# Patient Record
Sex: MALE | Race: WHITE | URBAN - METROPOLITAN AREA
[De-identification: names, ages, dates, MRNs, and addresses within clinical notes are randomized per-mention and may not be internally consistent; named-entity substitution may affect disease eponyms.]

---

## 2021-02-26 ENCOUNTER — OUTPATIENT (OUTPATIENT)
Dept: OUTPATIENT SERVICES | Facility: HOSPITAL | Age: 26
LOS: 1 days | End: 2021-02-26
Payer: COMMERCIAL

## 2021-02-26 DIAGNOSIS — Z20.828 CONTACT WITH AND (SUSPECTED) EXPOSURE TO OTHER VIRAL COMMUNICABLE DISEASES: ICD-10-CM

## 2021-02-26 LAB — SARS-COV-2 RNA SPEC QL NAA+PROBE: DETECTED

## 2021-02-26 PROCEDURE — U0003: CPT

## 2021-02-26 PROCEDURE — U0005: CPT

## 2021-02-26 PROCEDURE — C9803: CPT

## 2021-02-27 DIAGNOSIS — Z20.828 CONTACT WITH AND (SUSPECTED) EXPOSURE TO OTHER VIRAL COMMUNICABLE DISEASES: ICD-10-CM

## 2021-07-10 ENCOUNTER — EMERGENCY (EMERGENCY)
Facility: HOSPITAL | Age: 26
LOS: 1 days | End: 2021-07-10
Attending: EMERGENCY MEDICINE
Payer: COMMERCIAL

## 2021-07-10 VITALS
HEART RATE: 88 BPM | RESPIRATION RATE: 18 BRPM | SYSTOLIC BLOOD PRESSURE: 128 MMHG | WEIGHT: 240.08 LBS | HEIGHT: 73 IN | OXYGEN SATURATION: 98 % | DIASTOLIC BLOOD PRESSURE: 80 MMHG

## 2021-07-10 PROCEDURE — 99284 EMERGENCY DEPT VISIT MOD MDM: CPT | Mod: 25

## 2021-07-10 PROCEDURE — 73080 X-RAY EXAM OF ELBOW: CPT | Mod: 26,RT

## 2021-07-10 PROCEDURE — 73080 X-RAY EXAM OF ELBOW: CPT

## 2021-07-10 PROCEDURE — 99284 EMERGENCY DEPT VISIT MOD MDM: CPT

## 2021-07-10 RX ORDER — IBUPROFEN 200 MG
600 TABLET ORAL ONCE
Refills: 0 | Status: COMPLETED | OUTPATIENT
Start: 2021-07-10 | End: 2021-07-10

## 2021-07-10 RX ORDER — ACETAMINOPHEN 500 MG
650 TABLET ORAL ONCE
Refills: 0 | Status: COMPLETED | OUTPATIENT
Start: 2021-07-10 | End: 2021-07-10

## 2021-07-10 RX ADMIN — Medication 600 MILLIGRAM(S): at 18:35

## 2021-07-10 RX ADMIN — Medication 650 MILLIGRAM(S): at 18:34
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
## 2021-07-10 NOTE — ED PROVIDER NOTE - PHYSICAL EXAMINATION
Gen: non toxic appearing, NAD   Head: NC/NT  Eyes:  anicteric  ENT: airway patent, mmm   CV: RRR, +S1/S2, no M/R/G, symmetric pulses,   Resp: CTAB, symmetric breath sounds   GI:   abdomen soft non-distended, NTTP   Back: no spinal ttp  Extremities - FROM,  +ttp of RT elbow  Skin: no rashes, colors changes or bruising of back  Neuro: A&Ox4, following commands, speech clear, moving all four extremities spontaneously, 5/5 strength, sensation intact

## 2021-07-10 NOTE — ED PROVIDER NOTE - NS ED ROS FT
Gen: Denies fever, chills  CV: Denies chest pain  Skin: Denies rash, erythema  Resp: Denies SOB, cough  ENT: Denies nasal congestion  Eyes: Denies blurry vision  GI: Denies nausea, vomiting, diarrhea  Msk: +pain in RT elbow and back pain  : Denies dysuria  Neuro: Denies headache

## 2021-07-10 NOTE — ED PROVIDER NOTE - NSFOLLOWUPINSTRUCTIONS_ED_ALL_ED_FT
You were seen for back pain and elbow pain.     No signs of emergency medical condition on today's workup.  Your results are attached with your discharge instructions, please review them with your primary care physician. If there is a result pending, you will receive a call if test is positive.    A presumptive diagnosis is made today, but further evaluation may be required by your primary care doctor and/or specialist for a definitive diagnosis. Therefore, follow up as directed and if symptoms change/worsen or any emergency conditions, please return to the ER.    For pain or fever you can ibuprofen (motrin, advil) or tylenol as needed, as directed on packaging.  You can use 500-1000mg Tylenol every 6 hours for pain - as needed.  This is an over-the-counter medications - please respect the warnings on the label. This medication come with certain risks and side effects that you need to discuss with your doctor, especially if you are taking it for a prolonged period.  You can use 400-600mg Ibuprofen (such as motrin or advil) every 6 to 8 hours as needed for pain control.  Take ibuprofen with food or milk to lessen stomach upset.  This is an over-the-counter medication please respect the warnings on the label. All medications come with certain risks and side effects that you need to discuss with your doctor, especially if you are taking them for a prolonged period.      If needed, call patient access services at [phone] to find a primary care doctor, or call at [phone] to make an appointment at the clinic.

## 2021-07-10 NOTE — ED PROVIDER NOTE - PATIENT PORTAL LINK FT
You can access the FollowMyHealth Patient Portal offered by [facility] by registering at the following website: [email or web address]. By joining [employer]’s FollowMyHealth portal, you will also be able to view your health information using other applications (apps) compatible with our system.
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
## 2021-07-10 NOTE — ED PROVIDER NOTE - CARE PLAN
Principal Discharge DX:	Elbow pain  Secondary Diagnosis:	Back pain   Principal Discharge DX:	Elbow strain, right, initial encounter  Secondary Diagnosis:	Back pain

## 2021-08-17 ENCOUNTER — EMERGENCY (EMERGENCY)
Facility: HOSPITAL | Age: 26
LOS: 1 days | Discharge: HOME | End: 2021-08-17
Attending: EMERGENCY MEDICINE
Payer: COMMERCIAL

## 2021-08-17 VITALS
RESPIRATION RATE: 16 BRPM | OXYGEN SATURATION: 98 % | SYSTOLIC BLOOD PRESSURE: 129 MMHG | HEIGHT: 73 IN | DIASTOLIC BLOOD PRESSURE: 90 MMHG | TEMPERATURE: 98 F | HEART RATE: 63 BPM | WEIGHT: 218.92 LBS

## 2021-08-17 PROCEDURE — 99283 EMERGENCY DEPT VISIT LOW MDM: CPT

## 2021-08-17 PROCEDURE — 99282 EMERGENCY DEPT VISIT SF MDM: CPT

## 2021-08-17 NOTE — ED PROVIDER NOTE - PATIENT PORTAL LINK FT
You can access the FollowMyHealth Patient Portal offered by [facility] by registering at the following website: [email or web address]. By joining [employer]’s FollowMyHealth portal, you will also be able to view your health information using other applications (apps) compatible with our system.
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
## 2021-08-17 NOTE — ED PROVIDER NOTE - PHYSICAL EXAMINATION
A&Ox3, NAD. NCAT. PERRL, EOMI. conjunctiva clear, no injection, no FB. VA 20/20OU, OS, OD, Neck supple. Sensations intact throughout. Gait steady.

## 2021-08-17 NOTE — ED PROVIDER NOTE - NSFOLLOWUPINSTRUCTIONS_ED_ALL_ED_FT
- stay hydrated.   - take tylenol 975mg and ibuprofen 600mg every 6 hours as needed for pain-take with meals.  - follow up with your pcp in 1-2 days.  - return if symptoms worsen, fever, changes in vision, eye discharge, and all other concerns.

## 2021-08-17 NOTE — ED ADULT NURSE NOTE - NS ED NURSE DISCH DISPOSITION
Mother calling states only place that does testing is [facility] in [facility] the earliest appt available is 07/21. Mother concerned this is to long to wait, she was under the impression this test was urget.  Mother would like to speak to Dr stat Discharged
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
## 2021-08-17 NOTE — ED PROVIDER NOTE - NS ED ROS FT
Constitutional: No fever or chills  Eyes: No visual changes, eye pain or redness  Neuro: No headache. No numbness or tingling. No weakness.

## 2022-02-20 ENCOUNTER — EMERGENCY (EMERGENCY)
Facility: HOSPITAL | Age: 27
LOS: 1 days | Discharge: HOME | End: 2022-02-20
Attending: EMERGENCY MEDICINE | Admitting: EMERGENCY MEDICINE
Payer: COMMERCIAL

## 2022-02-20 VITALS
OXYGEN SATURATION: 100 % | TEMPERATURE: 99 F | DIASTOLIC BLOOD PRESSURE: 86 MMHG | RESPIRATION RATE: 18 BRPM | HEART RATE: 102 BPM | HEIGHT: 73 IN | SYSTOLIC BLOOD PRESSURE: 128 MMHG

## 2022-02-20 PROCEDURE — 73590 X-RAY EXAM OF LOWER LEG: CPT | Mod: 26,RT

## 2022-02-20 PROCEDURE — 99053 MED SERV 10PM-8AM 24 HR FAC: CPT

## 2022-02-20 PROCEDURE — 99283 EMERGENCY DEPT VISIT LOW MDM: CPT

## 2022-02-20 NOTE — ED PROVIDER NOTE - PHYSICAL EXAMINATION
Gen: Well appearing in NAD  Head: NC/AT  Neck: trachea midline  Resp:  No distress  Ext: no deformities R shin TTP no deformity - R knee with no pain and no instability  Neuro:  A&O appears non focal  Skin:  Warm and dry as visualized - except for two abrasions on prox R shin  Psych:  Normal affect and mood
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
## 2022-02-20 NOTE — ED PROVIDER NOTE - PATIENT PORTAL LINK FT
You can access the FollowMyHealth Patient Portal offered by [facility] by registering at the following website: [email or web address]. By joining [employer]’s FollowMyHealth portal, you will also be able to view your health information using other applications (apps) compatible with our system.

## 2022-02-20 NOTE — ED PROVIDER NOTE - CARE PLAN
1 Principal Discharge DX:	Contusion of knee and lower leg, initial encounter  Secondary Diagnosis:	Skin abrasion

## 2022-09-23 ENCOUNTER — EMERGENCY (EMERGENCY)
Facility: HOSPITAL | Age: 27
LOS: 1 days | Discharge: HOME | End: 2022-09-23
Attending: EMERGENCY MEDICINE | Admitting: EMERGENCY MEDICINE
Payer: OTHER MISCELLANEOUS

## 2022-09-23 VITALS
RESPIRATION RATE: 18 BRPM | TEMPERATURE: 98 F | SYSTOLIC BLOOD PRESSURE: 120 MMHG | HEART RATE: 55 BPM | DIASTOLIC BLOOD PRESSURE: 75 MMHG | OXYGEN SATURATION: 97 %

## 2022-09-23 VITALS
OXYGEN SATURATION: 96 % | SYSTOLIC BLOOD PRESSURE: 116 MMHG | HEART RATE: 69 BPM | RESPIRATION RATE: 17 BRPM | WEIGHT: 250 LBS | DIASTOLIC BLOOD PRESSURE: 70 MMHG | TEMPERATURE: 98 F | HEIGHT: 73 IN

## 2022-09-23 LAB
APPEARANCE UR: CLEAR — SIGNIFICANT CHANGE UP
BILIRUB UR-MCNC: NEGATIVE — SIGNIFICANT CHANGE UP
COLOR SPEC: YELLOW — SIGNIFICANT CHANGE UP
DIFF PNL FLD: NEGATIVE — SIGNIFICANT CHANGE UP
GLUCOSE UR QL: NEGATIVE — SIGNIFICANT CHANGE UP
KETONES UR-MCNC: NEGATIVE — SIGNIFICANT CHANGE UP
LEUKOCYTE ESTERASE UR-ACNC: NEGATIVE — SIGNIFICANT CHANGE UP
NITRITE UR-MCNC: NEGATIVE — SIGNIFICANT CHANGE UP
PH UR: 6 — SIGNIFICANT CHANGE UP (ref 5–8)
PROT UR-MCNC: NEGATIVE MG/DL — SIGNIFICANT CHANGE UP
SP GR SPEC: >=1.03 — SIGNIFICANT CHANGE UP (ref 1–1.03)
UROBILINOGEN FLD QL: 0.2 E.U./DL — SIGNIFICANT CHANGE UP

## 2022-09-23 PROCEDURE — 76870 US EXAM SCROTUM: CPT

## 2022-09-23 PROCEDURE — 99284 EMERGENCY DEPT VISIT MOD MDM: CPT | Mod: 25

## 2022-09-23 PROCEDURE — 81003 URINALYSIS AUTO W/O SCOPE: CPT

## 2022-09-23 PROCEDURE — 76870 US EXAM SCROTUM: CPT | Mod: 26

## 2022-09-23 PROCEDURE — 99284 EMERGENCY DEPT VISIT MOD MDM: CPT

## 2022-09-23 RX ORDER — IBUPROFEN 200 MG
800 TABLET ORAL ONCE
Refills: 0 | Status: COMPLETED | OUTPATIENT
Start: 2022-09-23 | End: 2022-09-23

## 2022-09-23 RX ADMIN — Medication 800 MILLIGRAM(S): at 20:22
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
## 2022-09-23 NOTE — ED ADULT NURSE NOTE - PAIN RATING/NUMBER SCALE (0-10): REST
[FreeTextEntry1] : Annual\par SBIRT negative\par Depression screen negative\par Immunization records provided\par HPV vaccine not received, refuses for now.\par \par Check comprehensive labs, in office today\par \par Anxiety Doing well on Zoloft. 3

## 2022-09-23 NOTE — ED PROVIDER NOTE - PATIENT PORTAL LINK FT
You can access the FollowMyHealth Patient Portal offered by [facility] by registering at the following website: [email or web address]. By joining [employer]’s FollowMyHealth portal, you will also be able to view your health information using other applications (apps) compatible with our system.

## 2022-09-23 NOTE — ED PROVIDER NOTE - CARE PROVIDER_API CALL
Abelardo Collado)  Urology  [address] [place] [address], [place] [address]  Phone: [phone]  Fax: [phone]  Follow Up Time:
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
## 2022-09-23 NOTE — ED PROVIDER NOTE - NSFOLLOWUPINSTRUCTIONS_ED_ALL_ED_FT
[facility]IN - AfterCare(R) Instructions(ER/ED)           Scrotal Pain    WHAT YOU NEED TO KNOW:    Scrotal pain can happen at any age. The cause of scrotal pain can range from a minor injury to a serious medical condition. It is very important to seek immediate care if you have scrotal pain. The pain may be a warning sign of a serious condition that will need treatment. Without immediate care, you may be at increased risk for losing a testicle or being sterile (not having children).    DISCHARGE INSTRUCTIONS:    Return to the emergency department if:   •You have any warning signs of a serious problem.      •You have pain or swelling that starts or gets worse quickly.      •You have skin changes in your scrotum, such as a dark patch.      •You have a fever.      Contact your healthcare provider if:   •Your pain does not get better, even after you take pain medicine.      •You have new or worsening pain.      •You have questions or concerns about your condition or care.      Medicines: You may need any of the following:   •Prescription pain medicine may be given. Ask your healthcare provider how to take this medicine safely. Some prescription pain medicines contain acetaminophen. Do not take other medicines that contain acetaminophen without talking to your healthcare provider. Too much acetaminophen may cause liver damage. Prescription pain medicine may cause constipation. Ask your healthcare provider how to prevent or treat constipation.       •NSAIDs, such as ibuprofen, help decrease swelling, pain, and fever. This medicine is available with or without a doctor's order. NSAIDs can cause stomach bleeding or kidney problems in certain people. If you take blood thinner medicine, always ask your healthcare provider if NSAIDs are safe for you. Always read the medicine label and follow directions.      •Antibiotics are used to treat a bacterial infection.      •Take your medicine as directed. Contact your healthcare provider if you think your medicine is not helping or if you have side effects. Tell your provider if you are allergic to any medicine. Keep a list of the medicines, vitamins, and herbs you take. Include the amounts, and when and why you take them. Bring the list or the pill bottles to follow-up visits. Carry your medicine list with you in case of an emergency.      Manage your symptoms:   •Wear a support device, if directed. A support device, such as a jock strap, can help keep your scrotum lifted and supported. This can help decrease pain.      •Apply ice to your scrotum. Ice helps decrease pain and swelling. Use an ice pack, or put crushed ice in a plastic bag. Cover the pack or bag with a towel before you apply it to your scrotum. Apply ice for 15 to 20 minutes every hour, or as directed.      Follow up with your doctor as directed: Write down your questions so you remember to ask them during your visits.
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
## 2022-09-24 PROBLEM — Z78.9 OTHER SPECIFIED HEALTH STATUS: Chronic | Status: ACTIVE | Noted: 2022-02-20
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
## 2022-09-25 DIAGNOSIS — N50.819 TESTICULAR PAIN, UNSPECIFIED: ICD-10-CM

## 2023-06-28 ENCOUNTER — NON-APPOINTMENT (OUTPATIENT)
Age: 28
End: 2023-06-28

## 2023-07-01 ENCOUNTER — EMERGENCY (EMERGENCY)
Facility: HOSPITAL | Age: 28
LOS: 0 days | Discharge: HOME | End: 2023-07-01
Attending: EMERGENCY MEDICINE
Payer: COMMERCIAL

## 2023-07-01 VITALS — WEIGHT: 145.06 LBS

## 2023-07-01 VITALS
HEART RATE: 57 BPM | TEMPERATURE: 98 F | DIASTOLIC BLOOD PRESSURE: 65 MMHG | OXYGEN SATURATION: 98 % | SYSTOLIC BLOOD PRESSURE: 113 MMHG | RESPIRATION RATE: 19 BRPM

## 2023-07-01 DIAGNOSIS — M79.10 MYALGIA, UNSPECIFIED SITE: ICD-10-CM

## 2023-07-01 DIAGNOSIS — R50.9 FEVER, UNSPECIFIED: ICD-10-CM

## 2023-07-01 DIAGNOSIS — R51.9 HEADACHE, UNSPECIFIED: ICD-10-CM

## 2023-07-01 LAB
ANION GAP SERPL CALC-SCNC: 2 MMOL/L — LOW (ref 5–17)
BASOPHILS # BLD AUTO: 0 K/UL — SIGNIFICANT CHANGE UP (ref 0–0.2)
BASOPHILS NFR BLD AUTO: 0 % — SIGNIFICANT CHANGE UP (ref 0–2)
BUN SERPL-MCNC: 14 MG/DL — SIGNIFICANT CHANGE UP (ref 7–23)
CALCIUM SERPL-MCNC: 9 MG/DL — SIGNIFICANT CHANGE UP (ref 8.5–10.1)
CHLORIDE SERPL-SCNC: 108 MMOL/L — SIGNIFICANT CHANGE UP (ref 96–108)
CO2 SERPL-SCNC: 28 MMOL/L — SIGNIFICANT CHANGE UP (ref 22–31)
CREAT SERPL-MCNC: 0.96 MG/DL — SIGNIFICANT CHANGE UP (ref 0.5–1.3)
EGFR: 111 ML/MIN/1.73M2 — SIGNIFICANT CHANGE UP
EOSINOPHIL # BLD AUTO: 0.28 K/UL — SIGNIFICANT CHANGE UP (ref 0–0.5)
EOSINOPHIL NFR BLD AUTO: 5 % — SIGNIFICANT CHANGE UP (ref 0–6)
GLUCOSE SERPL-MCNC: 91 MG/DL — SIGNIFICANT CHANGE UP (ref 70–99)
HCT VFR BLD CALC: 42.1 % — SIGNIFICANT CHANGE UP (ref 39–50)
HGB BLD-MCNC: 14.8 G/DL — SIGNIFICANT CHANGE UP (ref 13–17)
LYMPHOCYTES # BLD AUTO: 1.9 K/UL — SIGNIFICANT CHANGE UP (ref 1–3.3)
LYMPHOCYTES # BLD AUTO: 34 % — SIGNIFICANT CHANGE UP (ref 13–44)
MANUAL SMEAR VERIFICATION: SIGNIFICANT CHANGE UP
MCHC RBC-ENTMCNC: 30.1 PG — SIGNIFICANT CHANGE UP (ref 27–34)
MCHC RBC-ENTMCNC: 35.2 GM/DL — SIGNIFICANT CHANGE UP (ref 32–36)
MCV RBC AUTO: 85.7 FL — SIGNIFICANT CHANGE UP (ref 80–100)
MONOCYTES # BLD AUTO: 0.84 K/UL — SIGNIFICANT CHANGE UP (ref 0–0.9)
MONOCYTES NFR BLD AUTO: 15 % — HIGH (ref 2–14)
NEUTROPHILS # BLD AUTO: 2.13 K/UL — SIGNIFICANT CHANGE UP (ref 1.8–7.4)
NEUTROPHILS NFR BLD AUTO: 37 % — LOW (ref 43–77)
NEUTS BAND # BLD: 1 % — SIGNIFICANT CHANGE UP (ref 0–8)
NRBC # BLD: 0 /100 — SIGNIFICANT CHANGE UP (ref 0–0)
NRBC # BLD: SIGNIFICANT CHANGE UP /100 WBCS (ref 0–0)
PLAT MORPH BLD: NORMAL — SIGNIFICANT CHANGE UP
PLATELET # BLD AUTO: 166 K/UL — SIGNIFICANT CHANGE UP (ref 150–400)
POTASSIUM SERPL-MCNC: 4.5 MMOL/L — SIGNIFICANT CHANGE UP (ref 3.5–5.3)
POTASSIUM SERPL-SCNC: 4.5 MMOL/L — SIGNIFICANT CHANGE UP (ref 3.5–5.3)
RBC # BLD: 4.91 M/UL — SIGNIFICANT CHANGE UP (ref 4.2–5.8)
RBC # FLD: 11.9 % — SIGNIFICANT CHANGE UP (ref 10.3–14.5)
RBC BLD AUTO: NORMAL — SIGNIFICANT CHANGE UP
SODIUM SERPL-SCNC: 138 MMOL/L — SIGNIFICANT CHANGE UP (ref 135–145)
VARIANT LYMPHS # BLD: 8 % — HIGH (ref 0–6)
WBC # BLD: 5.6 K/UL — SIGNIFICANT CHANGE UP (ref 3.8–10.5)
WBC # FLD AUTO: 5.6 K/UL — SIGNIFICANT CHANGE UP (ref 3.8–10.5)

## 2023-07-01 PROCEDURE — 96374 THER/PROPH/DIAG INJ IV PUSH: CPT

## 2023-07-01 PROCEDURE — 85025 COMPLETE CBC W/AUTO DIFF WBC: CPT

## 2023-07-01 PROCEDURE — 99284 EMERGENCY DEPT VISIT MOD MDM: CPT

## 2023-07-01 PROCEDURE — 36415 COLL VENOUS BLD VENIPUNCTURE: CPT

## 2023-07-01 PROCEDURE — 80048 BASIC METABOLIC PNL TOTAL CA: CPT

## 2023-07-01 PROCEDURE — 99284 EMERGENCY DEPT VISIT MOD MDM: CPT | Mod: 25

## 2023-07-01 PROCEDURE — 96375 TX/PRO/DX INJ NEW DRUG ADDON: CPT

## 2023-07-01 RX ORDER — SODIUM CHLORIDE 9 MG/ML
1000 INJECTION INTRAMUSCULAR; INTRAVENOUS; SUBCUTANEOUS ONCE
Refills: 0 | Status: COMPLETED | OUTPATIENT
Start: 2023-07-01 | End: 2023-07-01

## 2023-07-01 RX ORDER — METOCLOPRAMIDE HCL 10 MG
10 TABLET ORAL ONCE
Refills: 0 | Status: COMPLETED | OUTPATIENT
Start: 2023-07-01 | End: 2023-07-01

## 2023-07-01 RX ORDER — KETOROLAC TROMETHAMINE 30 MG/ML
30 SYRINGE (ML) INJECTION ONCE
Refills: 0 | Status: DISCONTINUED | OUTPATIENT
Start: 2023-07-01 | End: 2023-07-01

## 2023-07-01 RX ADMIN — SODIUM CHLORIDE 1000 MILLILITER(S): 9 INJECTION INTRAMUSCULAR; INTRAVENOUS; SUBCUTANEOUS at 10:44

## 2023-07-01 RX ADMIN — Medication 30 MILLIGRAM(S): at 10:44

## 2023-07-01 RX ADMIN — Medication 10 MILLIGRAM(S): at 11:12

## 2023-07-01 NOTE — ED PROVIDER NOTE - NSFOLLOWUPCLINICS_GEN_ALL_ED_FT
[facility]  Family Medicine  [address] [place] [address]  Phone: [phone]  Fax:   Follow Up Time: 1-3 Days

## 2023-07-01 NOTE — ED PROVIDER NOTE - OBJECTIVE STATEMENT
27 y/o male with no pertinent PMHx presents to the ED c/o headache and myalgias x6 days, fever Tmax 101.9 x2 days, now with neck stiffness and worse headache. Patient tested negative for flu at urgent care and was prescribed Tamiflu, which he has been taking without improvement. Last took 2 Tylenol 7AM this morning. Denies numbness/weakness, vomiting, other symptoms. Denies concern for tick bite. Nonsmoker, nondrinker. NKDA. 29 y/o male with no pertinent PMHx presents to the ED c/o headache and myalgias x6 days, fever Tmax 101.9 x2 days, now with neck pain and worse headache. Patient tested negative for flu at urgent care and was prescribed Tamiflu, which he has been taking without improvement. Last took 2 Tylenol 7AM this morning. Denies numbness/weakness, vomiting, other symptoms. Denies concern for tick bite. Nonsmoker, nondrinker. NKDA.

## 2023-07-01 NOTE — ED PROVIDER NOTE - PATIENT PORTAL LINK FT
You can access the FollowMyHealth Patient Portal offered by [facility] by registering at the following website: [email or web address]. By joining [employer]’s FollowMyHealth portal, you will also be able to view your health information using other applications (apps) compatible with our system.

## 2023-07-01 NOTE — ED ADULT TRIAGE NOTE - CCCP TRG CHIEF CMPLNT
Received refill request from [facility] pharmacy for amlodipine 5 mg, med refilled electronically pt is within protocol.   headache

## 2023-07-01 NOTE — ED PROVIDER NOTE - NS ED ROS FT
Constitutional: +Fever, myalgias  Eyes: No visual changes  HEENT: No throat pain  CV: No chest pain  Resp: No SOB no cough  GI: No abd pain, nausea or vomiting  : No dysuria  MSK: No musculoskeletal pain  Skin: No rash  Neuro: +Headache

## 2023-07-01 NOTE — ED PROVIDER NOTE - CLINICAL SUMMARY MEDICAL DECISION MAKING FREE TEXT BOX
29 y/o male presents to the ED for fevers since Sunday, headache, body aches. Headache persistent, so came for evaluation. Exam nonfocal, no signs of meningitis. Likely viral. Instructed patient to stop taking Tamiflu as flu test was negative and flu is not prevalent now. Will symptom control, reassess. 27 y/o male presents to the ED for fevers since Sunday, headache, body aches. Headache persistent, so came for evaluation. Exam nonfocal, no signs of meningitis. Likely viral. Instructed patient to stop taking Tamiflu as flu test was negative and flu is not prevalent now. no signs or concern for SAH CVA dissection Will symptom control, reassess.    labs unremarkable. pt feeling better. vss. neuro exam normal no meningismus or concern for meningitis. Pt comfortable going home will dc with follow up and strict return precautions. Yoseph Medina M.D., Attending Physician

## 2023-07-01 NOTE — ED PROVIDER NOTE - PHYSICAL EXAMINATION
Constitutional: NAD AAOx3  Eyes: PERRLA EOMI  Head: Normocephalic atraumatic  Mouth: MMM  Cardiac: regular rate   Resp: unlabored breathing  GI: Abd s/nt/nd  Neuro: grossly normal and intact. Normal strength, sensation, coordination, and gait.   Skin: No visible rashes  MSK: FROM of neck, no meningismus. Constitutional: NAD well appearing non-toxic walked into ED without any issue AAOx3  Eyes: PERRLA EOMI  Head: Normocephalic atraumatic  Mouth: MMM  Cardiac: regular rate  normal peripheral pulses no LE swelling  Resp: unlabored breathing clear b/l   GI: Abd s/nt/nd  Neuro: grossly normal and intact. Normal strength, sensation, coordination, and gait.   Skin: No visible rashes  MSK: FROM of neck, no meningismus. negative kernigs and brudzinsky sign

## 2023-07-01 NOTE — ED ADULT TRIAGE NOTE - INTERNATIONAL TRAVEL
[facility] Acute Rehabilitation Unit   Discharge Summary     Date Admitted: 1/7/2018  Date Discharge: 1/16/2018    Discharge Diagnosis:   1. Acute inflammatory demyelinating polyneuropathy  2. Impaired mobility and activities of daily living  3. Neuropathic and musculoskeletal pain    Brief History of Presenting Illness and Hospital Course:  This is a 64 year old previously very healthy woman who developed weakness ultimately diagnosed with AIDP.  Underwent 5 day course of IVIG.  She transferred inpatient acute rehab 1/7 for complaints of cares.  Very pleased with functional progress Kaleigh has continued to dependently with a 2 wheeled walker.  Still limiting her pains in her lower extremities that seems a combination of neuropathic sensory demyelination but also muscle soreness.  Some adjustments to medications have been made as below.  She will have 1 or 2 visit home safety evaluation assessment by physical therapy then quick transition to outpatient physical therapy for ongoing support and recovery.  She is doing well with her activities of daily living.    Discharge Medications:   Kaleigh Ziegler Ashley   Home Medication Instructions MAXIMO:[id number]    Printed on:01/16/18 8914   Medication Information                      acetaminophen (TYLENOL) 500 MG tablet  Take 2 tablets (1,000 mg) by mouth 4 times daily as needed for mild pain or fever             amitriptyline (ELAVIL) 50 MG tablet  Take 1 tablet (50 mg) by mouth At Bedtime             cyclobenzaprine (FLEXERIL) 10 MG tablet  Take 1 tablet (10 mg) by mouth 3 times daily as needed for muscle spasms             diclofenac (VOLTAREN) 1 % GEL topical gel  Place 2 g onto the skin 3 times daily as needed for moderate pain             gabapentin (NEURONTIN) 300 MG capsule  Take 2-3 capsules (600-900 mg) by mouth 3 times daily             melatonin 3 MG tablet  Take 1 tablet (3 mg) by mouth nightly as needed for sleep              menthol (ICY HOT) 5 % PTCH  Apply 2 patches topically 2 times daily as needed for muscle soreness             sennosides (SENOKOT) 8.6 MG tablet  Take 1-2 tablets by mouth 2 times daily as needed for constipation             traMADol (ULTRAM) 50 MG tablet  Take 0.5-1 tablets (25-50 mg) by mouth 3 times daily as needed for breakthrough pain               Discharge Instructions:    Active Diet Order      Vegetarian Diet      Diet   Activity: Recommend activities of daily living but some help with more physical aspects.  Further Instructions: In addition to the above outlined medications may also use heat and/or ice along with stretching massage and other modalities to help with her pain.    Follow up Appointments:  See her primary provider Kelle Greer within a few weeks to follow-up hospitalization.  She is given prescription 1 month supplies for the above meds provider if she still needs them.  Follow-up with neurology 2/21/18.    Discharge Disposition: home with some support from .    Total Discharge time spent is > 30 minutes.   No

## 2023-07-01 NOTE — ED PROVIDER NOTE - NS_ ATTENDINGSCRIBEDETAILS _ED_A_ED_FT
I, Yoseph Medina MD,  performed the initial face to face bedside interview with this patient regarding history of present illness, review of symptoms and relevant past medical, social and family history.  I completed an independent physical examination.  I was the initial provider who evaluated this patient.   I personally saw the patient and performed a substantive portion of the visit including all aspects of the medical decision making.  The history, relevant review of systems, past medical and surgical history, medical decision making, and physical examination was documented by the scribe in my presence and I attest to the accuracy of the documentation.

## 2023-07-01 NOTE — ED ADULT TRIAGE NOTE - CHIEF COMPLAINT QUOTE
pt arrived c/o headache and fevers tmax 101.9 since Sunday. pt states he was seen at urgent care- flu swab negative but was prescribed Tamiflu without relief. pt states he is still having headache radiating to back of neck. denies hx of migraine headaches. denies n/v/d. ambulatory into triage

## 2023-07-01 NOTE — ED ADULT NURSE NOTE - NS ED NOTE  TALK SOMEONE YN
HPI  20 yo male with PMHx: Migraine, Extensive LLE DVT (Iliac, CFV, and popliteal) s/p thrombectomy on 6/9/2020 on Eliquis here d/t persistent fever d/t bacteremia. History dates back to 1 week prior to presentation when he started having symptoms of fever, nausea with intermittent dry cough while laying down. Fever was documented at home and has remained intermittent with fevers daily in the morning throughout his hospital stay. Pt was seen in the ER on 6/16 for fever and blood cx were sent. Blood cx came back positive for Staph aureus and pt was called in 2 days later to the come to the ER.     Patient has been receiving Ancef IV since cultures revealed MSSA, and cultures were negative for 6/19, 6/20, and preliminarily negative for 6/21 and 6/22. Patient was febrile with daily temperature spikes in the morning until today (98 and 98.2 F). Patient's TTE and JEREMIAS were negative for endocarditis. We performed CT of leg to assess for abscess causing persistent fever which was negative. ASO negative. RVP panel was sent today. Today is hospital day 7.    INTERVAL HPI / OVERNIGHT EVENTS:  Patient was examined and seen at bedside. This morning he is resting comfortably in bed and reports no new issues or overnight events.     ROS: Otherwise unremarkable     PAST MEDICAL & SURGICAL HISTORY  DVT, lower extremity: s/p thrombectomy  Hypercoagulable state, primary  History of thrombolytic therapy    ALLERGIES  amoxicillin (Rash)  penicillins (Unknown)    MEDICATIONS  STANDING MEDICATIONS  apixaban 5 milliGRAM(s) Oral every 12 hours  ceFAZolin   IVPB      ceFAZolin   IVPB 2000 milliGRAM(s) IV Intermittent every 8 hours  chlorhexidine 4% Liquid 1 Application(s) Topical <User Schedule>  melatonin 5 milliGRAM(s) Oral at bedtime    PRN MEDICATIONS  acetaminophen   Tablet .. 650 milliGRAM(s) Oral every 6 hours PRN    VITALS:  T(F): 100.2  HR: 91  BP: 116/63  RR: 18  SpO2: --    PHYSICAL EXAM  GEN: NAD, Resting comfortably in bed  PULM: Clear to auscultation bilaterally, No wheezes  CVS: Regular rate and rhythm, S1-S2, no murmurs  EXT: No edema  NEURO: A&Ox3, no focal deficits    LABS                        13.0   5.59  )-----------( 170      ( 23 Jun 2020 05:04 )             37.0     06-23    135  |  96<L>  |  12  ----------------------------<  106<H>  4.2   |  23  |  0.9    Ca    8.6      23 Jun 2020 05:04  Mg     2.3     06-23        Culture - Blood (collected 22 Jun 2020 06:22)  Source: .Blood None  Preliminary Report (23 Jun 2020 12:02):    No growth to date.    Culture - Blood (collected 21 Jun 2020 07:04)  Source: .Blood None  Preliminary Report (22 Jun 2020 13:01):    No growth to date. No

## 2023-07-01 NOTE — ED ADULT NURSE NOTE - NSFALLUNIVINTERV_ED_ALL_ED
Bed/Stretcher in lowest position, wheels locked, appropriate side rails in place/Call bell, personal items and telephone in reach/Instruct patient to call for assistance before getting out of bed/chair/stretcher/Non-slip footwear applied when patient is off stretcher/[place] to call system/Physically safe environment - no spills, clutter or unnecessary equipment/Purposeful proactive rounding/Room/bathroom lighting operational, light cord in reach

## 2023-07-01 NOTE — ED PROVIDER NOTE - NSFOLLOWUPINSTRUCTIONS_ED_ALL_ED_FT
1. return for worsening symptoms or anything concerning to you  2. take all home meds as prescribed  3. follow up with your pmd call to make an appointment  4. Take Tylenol 650 mg every 6 hours as needed for pain.  5. Take motrin 600mg PO Q6 hours prn pain  6. drink plenty of fluids    Acute Headache    WHAT YOU NEED TO KNOW:    An acute headache is pain or discomfort that starts suddenly and gets worse quickly. You may have an acute headache only when you feel stress or eat certain foods. Other acute headache pain can happen every day, and sometimes several times a day.     DISCHARGE INSTRUCTIONS:    Return to the emergency department if:     You have severe pain.      You have numbness or weakness on one side of your face or body.      You have a headache that occurs after a blow to the head, a fall, or other trauma.       You have a headache, are forgetful or confused, or have trouble speaking.      You have a headache, stiff neck, and a fever.    Contact your healthcare provider if:     You have a constant headache and are vomiting.      You have a headache each day that does not get better, even after treatment.      You have changes in your headaches, or new symptoms that occur when you have a headache.      You have questions or concerns about your condition or care.    Medicines: You may need any of the following:     Prescription pain medicine may be given. The medicine your healthcare provider recommends will depend on the kind of headaches you have. You will need to take prescription headache medicines as directed to prevent a problem called rebound headache. These headaches happen with regular use of pain relievers for headache disorders.      NSAIDs, such as ibuprofen, help decrease swelling, pain, and fever. This medicine is available with or without a doctor's order. NSAIDs can cause stomach bleeding or kidney problems in certain people. If you take blood thinner medicine, always ask your healthcare provider if NSAIDs are safe for you. Always read the medicine label and follow directions.      Acetaminophen decreases pain and fever. It is available without a doctor's order. Ask how much to take and how often to take it. Follow directions. Read the labels of all other medicines you are using to see if they also contain acetaminophen, or ask your doctor or pharmacist. Acetaminophen can cause liver damage if not taken correctly. Do not use more than 3 grams (3,000 milligrams) total of acetaminophen in one day.       Antidepressants may be given for some kinds of headaches.       Take your medicine as directed. Contact your healthcare provider if you think your medicine is not helping or if you have side effects. Tell him or her if you are allergic to any medicine. Keep a list of the medicines, vitamins, and herbs you take. Include the amounts, and when and why you take them. Bring the list or the pill bottles to follow-up visits. Carry your medicine list with you in case of an emergency.    Manage your symptoms:     Apply heat or ice on the headache area. Use a heat or ice pack. For an ice pack, you can also put crushed ice in a plastic bag. Cover the pack or bag with a towel before you apply it to your skin. Ice and heat both help decrease pain, and heat also helps decrease muscle spasms. Apply heat for 20 to 30 minutes every 2 hours. Apply ice for 15 to 20 minutes every hour. Apply heat or ice for as long and for as many days as directed. You may alternate heat and ice.      Relax your muscles. Lie down in a comfortable position and close your eyes. Relax your muscles slowly. Start at your toes and work your way up your body.      Keep a record of your headaches. Write down when your headaches start and stop. Include your symptoms and what you were doing when the headache began. Record what you ate or drank for 24 hours before the headache started. Describe the pain and where it hurts. Keep track of what you did to treat your headache and if it worked.     Prevent an acute headache:     Avoid anything that triggers an acute headache. Examples include exposure to chemicals, going to high altitude, or not getting enough sleep. Create a regular sleep routine. Go to sleep at the same time and wake up at the same time each day. Do not use electronic devices before bedtime. These may trigger a headache or prevent you from sleeping well.      Do not smoke. Nicotine and other chemicals in cigarettes and cigars can trigger an acute headache or make it worse. Ask your healthcare provider for information if you currently smoke and need help to quit. E-cigarettes or smokeless tobacco still contain nicotine. Talk to your healthcare provider before you use these products.       Limit alcohol as directed. Alcohol can trigger an acute headache or make it worse. If you have cluster headaches, do not drink alcohol during an episode. For other types of headaches, ask your healthcare provider if it is safe for you to drink alcohol. Ask how much is safe for you to drink, and how often.      Exercise as directed. Exercise can reduce tension and help with headache pain. Aim for 30 minutes of physical activity on most days of the week. Your healthcare provider can help you create an exercise plan.      Eat a variety of healthy foods. Healthy foods include fruits, vegetables, low-fat dairy products, lean meats, fish, whole grains, and cooked beans. Your healthcare provider or dietitian can help you create meals plans if you need to avoid foods that trigger headaches.    Follow up with your healthcare provider as directed: Bring your headache record with you when you see your healthcare provider. Write down your questions so you remember to ask them during your visits.

## 2023-07-07 ENCOUNTER — INPATIENT (INPATIENT)
Facility: HOSPITAL | Age: 28
LOS: 4 days | Discharge: HOME | DRG: 866 | End: 2023-07-12
Attending: STUDENT IN AN ORGANIZED HEALTH CARE EDUCATION/TRAINING PROGRAM | Admitting: INTERNAL MEDICINE
Payer: COMMERCIAL

## 2023-07-07 VITALS — HEIGHT: 72 IN | WEIGHT: 250 LBS

## 2023-07-07 DIAGNOSIS — R50.9 FEVER, UNSPECIFIED: ICD-10-CM

## 2023-07-07 LAB
ADD ON TEST-SPECIMEN IN LAB: SIGNIFICANT CHANGE UP
ALBUMIN SERPL ELPH-MCNC: 3.5 G/DL — SIGNIFICANT CHANGE UP (ref 3.3–5)
ALP SERPL-CCNC: 113 U/L — SIGNIFICANT CHANGE UP (ref 40–120)
ALT FLD-CCNC: 253 U/L — HIGH (ref 12–78)
ANION GAP SERPL CALC-SCNC: 6 MMOL/L — SIGNIFICANT CHANGE UP (ref 5–17)
APPEARANCE UR: CLEAR — SIGNIFICANT CHANGE UP
APTT BLD: 34 SEC — SIGNIFICANT CHANGE UP (ref 27.5–35.5)
AST SERPL-CCNC: 78 U/L — HIGH (ref 15–37)
BASOPHILS # BLD AUTO: 0 K/UL — SIGNIFICANT CHANGE UP (ref 0–0.2)
BASOPHILS NFR BLD AUTO: 0 % — SIGNIFICANT CHANGE UP (ref 0–2)
BILIRUB SERPL-MCNC: 0.9 MG/DL — SIGNIFICANT CHANGE UP (ref 0.2–1.2)
BILIRUB UR-MCNC: NEGATIVE — SIGNIFICANT CHANGE UP
BUN SERPL-MCNC: 9 MG/DL — SIGNIFICANT CHANGE UP (ref 7–23)
CALCIUM SERPL-MCNC: 8.6 MG/DL — SIGNIFICANT CHANGE UP (ref 8.5–10.1)
CHLORIDE SERPL-SCNC: 104 MMOL/L — SIGNIFICANT CHANGE UP (ref 96–108)
CO2 SERPL-SCNC: 26 MMOL/L — SIGNIFICANT CHANGE UP (ref 22–31)
COLOR SPEC: YELLOW — SIGNIFICANT CHANGE UP
CREAT SERPL-MCNC: 0.98 MG/DL — SIGNIFICANT CHANGE UP (ref 0.5–1.3)
DIFF PNL FLD: NEGATIVE — SIGNIFICANT CHANGE UP
EGFR: 108 ML/MIN/1.73M2 — SIGNIFICANT CHANGE UP
EOSINOPHIL # BLD AUTO: 0.07 K/UL — SIGNIFICANT CHANGE UP (ref 0–0.5)
EOSINOPHIL NFR BLD AUTO: 1 % — SIGNIFICANT CHANGE UP (ref 0–6)
GLUCOSE SERPL-MCNC: 91 MG/DL — SIGNIFICANT CHANGE UP (ref 70–99)
GLUCOSE UR QL: NEGATIVE — SIGNIFICANT CHANGE UP
HCT VFR BLD CALC: 40.6 % — SIGNIFICANT CHANGE UP (ref 39–50)
HGB BLD-MCNC: 14.4 G/DL — SIGNIFICANT CHANGE UP (ref 13–17)
INR BLD: 1.18 RATIO — HIGH (ref 0.88–1.16)
KETONES UR-MCNC: NEGATIVE — SIGNIFICANT CHANGE UP
LACTATE SERPL-SCNC: 1.1 MMOL/L — SIGNIFICANT CHANGE UP (ref 0.7–2)
LEUKOCYTE ESTERASE UR-ACNC: NEGATIVE — SIGNIFICANT CHANGE UP
LYMPHOCYTES # BLD AUTO: 3.55 K/UL — HIGH (ref 1–3.3)
LYMPHOCYTES # BLD AUTO: 51 % — HIGH (ref 13–44)
MCHC RBC-ENTMCNC: 30.6 PG — SIGNIFICANT CHANGE UP (ref 27–34)
MCHC RBC-ENTMCNC: 35.5 GM/DL — SIGNIFICANT CHANGE UP (ref 32–36)
MCV RBC AUTO: 86.4 FL — SIGNIFICANT CHANGE UP (ref 80–100)
MONOCYTES # BLD AUTO: 0.77 K/UL — SIGNIFICANT CHANGE UP (ref 0–0.9)
MONOCYTES NFR BLD AUTO: 11 % — SIGNIFICANT CHANGE UP (ref 2–14)
NEUTROPHILS # BLD AUTO: 2.16 K/UL — SIGNIFICANT CHANGE UP (ref 1.8–7.4)
NEUTROPHILS NFR BLD AUTO: 31 % — LOW (ref 43–77)
NITRITE UR-MCNC: NEGATIVE — SIGNIFICANT CHANGE UP
NRBC # BLD: SIGNIFICANT CHANGE UP /100 WBCS (ref 0–0)
PH UR: 6.5 — SIGNIFICANT CHANGE UP (ref 5–8)
PLATELET # BLD AUTO: 202 K/UL — SIGNIFICANT CHANGE UP (ref 150–400)
POTASSIUM SERPL-MCNC: 3.8 MMOL/L — SIGNIFICANT CHANGE UP (ref 3.5–5.3)
POTASSIUM SERPL-SCNC: 3.8 MMOL/L — SIGNIFICANT CHANGE UP (ref 3.5–5.3)
PROT SERPL-MCNC: 7.2 GM/DL — SIGNIFICANT CHANGE UP (ref 6–8.3)
PROT UR-MCNC: NEGATIVE — SIGNIFICANT CHANGE UP
PROTHROM AB SERPL-ACNC: 13.7 SEC — HIGH (ref 10.5–13.4)
RAPID RVP RESULT: SIGNIFICANT CHANGE UP
RBC # BLD: 4.7 M/UL — SIGNIFICANT CHANGE UP (ref 4.2–5.8)
RBC # FLD: 12.7 % — SIGNIFICANT CHANGE UP (ref 10.3–14.5)
SARS-COV-2 RNA SPEC QL NAA+PROBE: SIGNIFICANT CHANGE UP
SODIUM SERPL-SCNC: 136 MMOL/L — SIGNIFICANT CHANGE UP (ref 135–145)
SP GR SPEC: 1.01 — SIGNIFICANT CHANGE UP (ref 1.01–1.02)
TSH SERPL-MCNC: 1.7 UU/ML — SIGNIFICANT CHANGE UP (ref 0.34–4.82)
UROBILINOGEN FLD QL: 1
WBC # BLD: 6.97 K/UL — SIGNIFICANT CHANGE UP (ref 3.8–10.5)
WBC # FLD AUTO: 6.97 K/UL — SIGNIFICANT CHANGE UP (ref 3.8–10.5)

## 2023-07-07 PROCEDURE — 82306 VITAMIN D 25 HYDROXY: CPT

## 2023-07-07 PROCEDURE — 86664 EPSTEIN-BARR NUCLEAR ANTIGEN: CPT

## 2023-07-07 PROCEDURE — 93306 TTE W/DOPPLER COMPLETE: CPT

## 2023-07-07 PROCEDURE — 84478 ASSAY OF TRIGLYCERIDES: CPT

## 2023-07-07 PROCEDURE — 85027 COMPLETE CBC AUTOMATED: CPT

## 2023-07-07 PROCEDURE — 80053 COMPREHEN METABOLIC PANEL: CPT

## 2023-07-07 PROCEDURE — 86618 LYME DISEASE ANTIBODY: CPT

## 2023-07-07 PROCEDURE — 87798 DETECT AGENT NOS DNA AMP: CPT

## 2023-07-07 PROCEDURE — 88237 TISSUE CULTURE BONE MARROW: CPT

## 2023-07-07 PROCEDURE — 88285 CHROMOSOME COUNT ADDITIONAL: CPT

## 2023-07-07 PROCEDURE — 87484 EHRLICHA CHAFFEENSIS AMP PRB: CPT

## 2023-07-07 PROCEDURE — 88184 FLOWCYTOMETRY/ TC 1 MARKER: CPT

## 2023-07-07 PROCEDURE — 88185 FLOWCYTOMETRY/TC ADD-ON: CPT

## 2023-07-07 PROCEDURE — 99223 1ST HOSP IP/OBS HIGH 75: CPT

## 2023-07-07 PROCEDURE — 86160 COMPLEMENT ANTIGEN: CPT

## 2023-07-07 PROCEDURE — 87507 IADNA-DNA/RNA PROBE TQ 12-25: CPT

## 2023-07-07 PROCEDURE — 99285 EMERGENCY DEPT VISIT HI MDM: CPT

## 2023-07-07 PROCEDURE — 85384 FIBRINOGEN ACTIVITY: CPT

## 2023-07-07 PROCEDURE — 87468 ANAPLSMA PHGCYTOPHLM AMP PRB: CPT

## 2023-07-07 PROCEDURE — 86334 IMMUNOFIX E-PHORESIS SERUM: CPT

## 2023-07-07 PROCEDURE — 82728 ASSAY OF FERRITIN: CPT

## 2023-07-07 PROCEDURE — 71046 X-RAY EXAM CHEST 2 VIEWS: CPT | Mod: 26

## 2023-07-07 PROCEDURE — 85652 RBC SED RATE AUTOMATED: CPT

## 2023-07-07 PROCEDURE — 86663 EPSTEIN-BARR ANTIBODY: CPT

## 2023-07-07 PROCEDURE — 88280 CHROMOSOME KARYOTYPE STUDY: CPT

## 2023-07-07 PROCEDURE — 88264 CHROMOSOME ANALYSIS 20-25: CPT

## 2023-07-07 PROCEDURE — 86038 ANTINUCLEAR ANTIBODIES: CPT

## 2023-07-07 PROCEDURE — 87205 SMEAR GRAM STAIN: CPT

## 2023-07-07 PROCEDURE — 86431 RHEUMATOID FACTOR QUANT: CPT

## 2023-07-07 PROCEDURE — 87103 BLOOD FUNGUS CULTURE: CPT

## 2023-07-07 PROCEDURE — 84165 PROTEIN E-PHORESIS SERUM: CPT

## 2023-07-07 PROCEDURE — 86255 FLUORESCENT ANTIBODY SCREEN: CPT

## 2023-07-07 PROCEDURE — 93010 ELECTROCARDIOGRAM REPORT: CPT

## 2023-07-07 PROCEDURE — 86665 EPSTEIN-BARR CAPSID VCA: CPT

## 2023-07-07 PROCEDURE — 82164 ANGIOTENSIN I ENZYME TEST: CPT

## 2023-07-07 PROCEDURE — 82652 VIT D 1 25-DIHYDROXY: CPT

## 2023-07-07 PROCEDURE — 71250 CT THORAX DX C-: CPT

## 2023-07-07 PROCEDURE — 86235 NUCLEAR ANTIGEN ANTIBODY: CPT

## 2023-07-07 PROCEDURE — 86644 CMV ANTIBODY: CPT

## 2023-07-07 PROCEDURE — 83520 IMMUNOASSAY QUANT NOS NONAB: CPT

## 2023-07-07 PROCEDURE — 86200 CCP ANTIBODY: CPT

## 2023-07-07 PROCEDURE — 84155 ASSAY OF PROTEIN SERUM: CPT

## 2023-07-07 PROCEDURE — 85540 WBC ALKALINE PHOSPHATASE: CPT

## 2023-07-07 PROCEDURE — 36415 COLL VENOUS BLD VENIPUNCTURE: CPT

## 2023-07-07 PROCEDURE — 86225 DNA ANTIBODY NATIVE: CPT

## 2023-07-07 PROCEDURE — 87389 HIV-1 AG W/HIV-1&-2 AB AG IA: CPT

## 2023-07-07 PROCEDURE — 85025 COMPLETE CBC W/AUTO DIFF WBC: CPT

## 2023-07-07 PROCEDURE — 86645 CMV ANTIBODY IGM: CPT

## 2023-07-07 RX ORDER — ACETAMINOPHEN 500 MG
650 TABLET ORAL ONCE
Refills: 0 | Status: COMPLETED | OUTPATIENT
Start: 2023-07-07 | End: 2023-07-07

## 2023-07-07 RX ORDER — ONDANSETRON 8 MG/1
4 TABLET, FILM COATED ORAL EVERY 8 HOURS
Refills: 0 | Status: DISCONTINUED | OUTPATIENT
Start: 2023-07-07 | End: 2023-07-12

## 2023-07-07 RX ORDER — LANOLIN ALCOHOL/MO/W.PET/CERES
3 CREAM (GRAM) TOPICAL AT BEDTIME
Refills: 0 | Status: DISCONTINUED | OUTPATIENT
Start: 2023-07-07 | End: 2023-07-12

## 2023-07-07 RX ORDER — SODIUM CHLORIDE 9 MG/ML
1000 INJECTION INTRAMUSCULAR; INTRAVENOUS; SUBCUTANEOUS ONCE
Refills: 0 | Status: COMPLETED | OUTPATIENT
Start: 2023-07-07 | End: 2023-07-07

## 2023-07-07 RX ORDER — ACETAMINOPHEN 500 MG
650 TABLET ORAL EVERY 6 HOURS
Refills: 0 | Status: DISCONTINUED | OUTPATIENT
Start: 2023-07-07 | End: 2023-07-07

## 2023-07-07 RX ORDER — ACETAMINOPHEN 500 MG
650 TABLET ORAL EVERY 6 HOURS
Refills: 0 | Status: DISCONTINUED | OUTPATIENT
Start: 2023-07-07 | End: 2023-07-11

## 2023-07-07 RX ADMIN — Medication 650 MILLIGRAM(S): at 18:04

## 2023-07-07 RX ADMIN — SODIUM CHLORIDE 1000 MILLILITER(S): 9 INJECTION INTRAMUSCULAR; INTRAVENOUS; SUBCUTANEOUS at 17:14

## 2023-07-07 NOTE — ED ADULT NURSE NOTE - OBJECTIVE STATEMENT
Patient presents to ED for fever x2 weeks. Pt states that at [facility] this temperature has been reading 102-103 and has been taking Tylenol. Pt has been seen here last week and at [facility] and had negative workup there as well. Pt denies chest pain, nausea/vomiting, diarrhea.

## 2023-07-07 NOTE — ED ADULT NURSE NOTE - NSFALLUNIVINTERV_ED_ALL_ED
Bed/Stretcher in lowest position, wheels locked, appropriate side rails in place/Call bell, personal items and telephone in reach/Instruct patient to call for assistance before getting out of bed/chair/stretcher/Non-slip footwear applied when patient is off stretcher/[place] to call system/Physically safe environment - no spills, clutter or unnecessary equipment/Purposeful proactive rounding/Room/bathroom lighting operational, light cord in reach

## 2023-07-07 NOTE — ED STATDOCS - OBJECTIVE STATEMENT
Patient is a 28 year-old-male with no PMH presents with 2-week history of fever. reports Tmax of 102-103 every day which he has been taking tylenol. Seen here a week ago and had negative workup. Went to see PMD on Wednesday and had an episode of vomiting and sent to [place] and had negative infectious workup there, also had CT done which showed splenomegaly. Reports 2.5wk history of headache, right now asymptomatic. Denies chest pain, shortness of breath, abdominal pain. No recent surgery or dental procedures.

## 2023-07-07 NOTE — H&P ADULT - HISTORY OF PRESENT ILLNESS
28 year old male no known PMHx presents to the [facility] for further evaluation and management of a 2 week history of intractable fevers (TMax 102.8'F) despite taking Acetaminophen at home. The patient was reportedly evaluated last week in the [facility]. Workup including blood work were reportedly negative. The patient was then evaluated by his PCP on Wednesday where the patient had intractable vomiting. The patient was referred to [facility] at that time where the patient reportedly had a negative infectious workup there as well. A CT scan revealed splenomegaly. The patient additionally c/o a 2 week history of headaches. He denies any associated chest pain, palpitations, shrotenss of breath, abdominal pain, nausea, or recent sick contacts. Vital signs in triage => /76, HR 83/min, RR 18/min, TMax 100.9'F, SpO2 99% on room air. Labs => lynph% 51, AST/ALT 78/253. CXR => No radiographic evidence of active chest disease. In the ED the patient was given Acetaminophen 650mg PO x 1, and NS x 1L.   28 year old male no known PMHx presents to the [facility] for further evaluation and management of a 2 week history of intractable fevers (TMax 102.8'F) despite taking Acetaminophen at home. The patient was reportedly evaluated last week in the [facility]. Workup including blood work were reportedly negative. The patient was then evaluated by his PCP on Wednesday where the patient had intractable vomiting. The patient was referred to [facility] at that time where the patient reportedly had a negative infectious workup there as well. A CT scan revealed splenomegaly. The patient additionally c/o a 2 week history of headaches. He denies any associated chest pain, palpitations, shortness of breath, abdominal pain, nausea, or recent sick contacts. Vital signs in triage => /76, HR 83/min, RR 18/min, TMax 100.9'F, SpO2 99% on room air. Labs => lymph% 51, AST/ALT 78/253. CXR => No radiographic evidence of active chest disease. In the ED the patient was given Acetaminophen 650mg PO x 1, and NS x 1L.

## 2023-07-07 NOTE — ED STATDOCS - ATTENDING CONTRIBUTION TO CARE
I, Betsy Kim DO,  performed the initial face to face bedside interview with this patient regarding history of present illness, review of symptoms and relevant past medical, social and family history.  I completed an independent physical examination.  I was the initial provider who evaluated this patient. I have signed out the follow up of any pending tests (i.e. labs, radiological studies) to the resident.  I have communicated the patient’s plan of care and disposition with the resident.

## 2023-07-07 NOTE — ED ADULT NURSE NOTE - CHIEF COMPLAINT QUOTE
pt presents with high fevers (102.8) and headaches x2 weeks. recently evaluated at [facility], received blood work with all negative results. pt requesting to see ID.

## 2023-07-07 NOTE — ED ADULT TRIAGE NOTE - CHIEF COMPLAINT QUOTE
pt presents with high fevers (102.8) and headaches x2 weeks. recently evaluated at [facility], received blood work with all negative results. pt requesting to see ID.

## 2023-07-07 NOTE — ED STATDOCS - CLINICAL SUMMARY MEDICAL DECISION MAKING FREE TEXT BOX
Patient is a 28 year-old-male with no PMH presents with 2-week history of fever of unknown etiologies. Currently asymptomatic. So far had workup showed splenomegaly and elevated LFTs. Will obtain sepsis workup including blood culture, hepatitis panel, TSH, TBquant and tick-borne disease. Disposition pending workup.

## 2023-07-07 NOTE — PATIENT PROFILE ADULT - STATED REASON FOR ADMISSION
Fever for 2 weeks, bad headaches and body aches  took tamiflu for 2 days from urgent care, wants ID consult

## 2023-07-07 NOTE — ED STATDOCS - PHYSICAL EXAMINATION
Vitals: I have reviewed the patients vital signs  General: Well dressed, well appearing, no acute distress  HEENT: Atraumatic, normocephalic, airway patent, BL TM clear   Eyes: EOMI, tracking appropriately  Neck: no tracheal deviation, no JVD  Chest/Lungs: no trauma, symmetric chest rise, speaking in complete sentences, no WOB  Heart: skin and extremities well perfused, regular rate and rhythm  Abdomen: soft, nontender and nondistended   Neuro: A+Ox3, ambulating without difficulty, CN grossly intact  MSK: strength at baseline in all extremities, no muscle wasting or atrophy  Skin: no cyanosis, no jaundice, no new emergent lesions

## 2023-07-07 NOTE — H&P ADULT - ASSESSMENT
28 year old male no known PMHx presents to the [facility] for further evaluation and management of a 2 week history of intractable fevers (TMax 102.8'F) despite taking Acetaminophen at home. The patient was reportedly evaluated last week in the [facility]. Workup including blood work were reportedly negative. The patient was then evaluated by his PCP on Wednesday where the patient had intractable vomiting. The patient was referred to [facility] at that time where the patient reportedly had a negative infectious workup there as well. A CT scan revealed splenomegaly. The patient additionally c/o a 2 week history of headaches. He denies any associated chest pain, palpitations, shrotenss of breath, abdominal pain, nausea, or recent sick contacts. Vital signs in triage => /76, HR 83/min, RR 18/min, TMax 100.9'F, SpO2 99% on room air. Labs => lynph% 51, AST/ALT 78/253. CXR => No radiographic evidence of active chest disease. In the ED the patient was given Acetaminophen 650mg PO x 1, and NS x 1L.     28 year old male no known PMHx presents to the [facility] for further evaluation and management of a 2 week history of intractable fevers (TMax 102.8'F) despite taking Acetaminophen at home. The patient was reportedly evaluated last week in the [facility]. Workup including blood work were reportedly negative. The patient was then evaluated by his PCP on Wednesday where the patient had intractable vomiting. The patient was referred to [facility] at that time where the patient reportedly had a negative infectious workup there as well. A CT scan revealed splenomegaly. The patient additionally c/o a 2 week history of headaches. He denies any associated chest pain, palpitations, shortness of breath, abdominal pain, nausea, or recent sick contacts. Vital signs in triage => /76, HR 83/min, RR 18/min, TMax 100.9'F, SpO2 99% on room air. Labs => lymph% 51, AST/ALT 78/253. CXR => No radiographic evidence of active chest disease. In the ED the patient was given Acetaminophen 650mg PO x 1, and NS x 1L.    #Fevers of Unknown Origin  ~admit to Medicine  ~DDx includes infections, malignancies, and systemic rheumatic diseases  ~f/u Blood C+S  ~f/u Fungal C+S  ~f/u Urinalysis  ~f/u Urine C+S  ~f/u Lyme Vise IgG/IgM AB Reflex  ~f/u Babesia microti PCR  ~f/u Enrlichea/Anaplasma PCR  ~f/u Viral Hepatitis profile  ~f/u ESR/CRP/MARISOL  ~f/u rheumatoid factor  ~heterophile antibody test for mononucleosis  ~f/u tuberculin skin test or interferon-gamma release assay  ~f/u serum protein electrophoresis  ~cont. anti-pyretics prn  ~cont. IV hydration  ~f/u w Infectious disease consultation in the am    #Vte ppx  ~cont. SCDs for now      28 year old male no known PMHx presents to the [facility] for further evaluation and management of a 2 week history of intractable fevers (TMax 102.8'F) despite taking Acetaminophen at home. The patient was reportedly evaluated last week in the [facility]. Workup including blood work were reportedly negative. The patient was then evaluated by his PCP on Wednesday where the patient had intractable vomiting. The patient was referred to [facility] at that time where the patient reportedly had a negative infectious workup there as well. A CT scan revealed splenomegaly. The patient additionally c/o a 2 week history of headaches. He denies any associated chest pain, palpitations, shortness of breath, abdominal pain, nausea, or recent sick contacts. Vital signs in triage => /76, HR 83/min, RR 18/min, TMax 100.9'F, SpO2 99% on room air. Labs => lymph% 51, AST/ALT 78/253. CXR => No radiographic evidence of active chest disease. In the ED the patient was given Acetaminophen 650mg PO x 1, and NS x 1L.    #Fevers of Unknown Origin  ~admit to Medicine  ~DDx includes infections, malignancies, and systemic rheumatic diseases  ~f/u Blood C+S  ~f/u Fungal C+S  ~f/u Urinalysis  ~f/u Urine C+S  ~f/u Lyme Vise IgG/IgM AB Reflex  ~f/u Babesia microti PCR  ~f/u Enrlichea/Anaplasma PCR  ~f/u Viral Hepatitis profile  ~f/u ESR/CRP/MARISOL  ~f/u rheumatoid factor  ~heterophile antibody test for mononucleosis  ~f/u tuberculin skin test or interferon-gamma release assay  ~f/u serum protein electrophoresis  ~f/u HIV AB testing  ~cont. anti-pyretics prn  ~cont. IV hydration  ~f/u w Infectious disease consultation in the am  ~f/u 2DECHO in the am  ~f/u GI PCR  ~peripheral smear    #Vte ppx  ~cont. SCDs for now

## 2023-07-07 NOTE — H&P ADULT - NSHPPHYSICALEXAM_GEN_ALL_CORE
Vital Signs Last 24 Hrs  T(C): 36.9 (07 Jul 2023 21:20), Max: 38.3 (07 Jul 2023 17:35)  T(F): 98.4 (07 Jul 2023 21:20), Max: 100.9 (07 Jul 2023 17:35)  HR: 78 (07 Jul 2023 21:20) (76 - 83)  BP: 137/69 (07 Jul 2023 21:20) (108/76 - 137/69)  BP(mean): 82 (07 Jul 2023 20:54) (82 - 87)  RR: 18 (07 Jul 2023 21:20) (16 - 18)  SpO2: 99% (07 Jul 2023 21:20) (99% - 99%)    Parameters below as of 07 Jul 2023 20:54  Patient On (Oxygen Delivery Method): room air

## 2023-07-07 NOTE — ED STATDOCS - PROGRESS NOTE DETAILS
Bennie PGY3  Rectal temp of 100.9F. Workup so far negative n the ED, except for elevated LFTs. Discussed with hospitalist - will admit for further management.

## 2023-07-08 LAB
ALBUMIN SERPL ELPH-MCNC: 3 G/DL — LOW (ref 3.3–5)
ALP SERPL-CCNC: 105 U/L — SIGNIFICANT CHANGE UP (ref 40–120)
ALT FLD-CCNC: 232 U/L — HIGH (ref 12–78)
ANION GAP SERPL CALC-SCNC: 5 MMOL/L — SIGNIFICANT CHANGE UP (ref 5–17)
AST SERPL-CCNC: 84 U/L — HIGH (ref 15–37)
B BURGDOR C6 AB SER-ACNC: NEGATIVE — SIGNIFICANT CHANGE UP
B BURGDOR IGG+IGM SER QL IB: SIGNIFICANT CHANGE UP
B BURGDOR IGG+IGM SER-ACNC: 0.6 INDEX — SIGNIFICANT CHANGE UP (ref 0.01–0.89)
BABESIA MICROTI PCR, BLD RESULT: SIGNIFICANT CHANGE UP
BASOPHILS # BLD AUTO: 0.09 K/UL — SIGNIFICANT CHANGE UP (ref 0–0.2)
BASOPHILS NFR BLD AUTO: 1.2 % — SIGNIFICANT CHANGE UP (ref 0–2)
BILIRUB SERPL-MCNC: 0.6 MG/DL — SIGNIFICANT CHANGE UP (ref 0.2–1.2)
BUN SERPL-MCNC: 9 MG/DL — SIGNIFICANT CHANGE UP (ref 7–23)
CALCIUM SERPL-MCNC: 8.3 MG/DL — LOW (ref 8.5–10.1)
CHLORIDE SERPL-SCNC: 106 MMOL/L — SIGNIFICANT CHANGE UP (ref 96–108)
CO2 SERPL-SCNC: 26 MMOL/L — SIGNIFICANT CHANGE UP (ref 22–31)
CREAT SERPL-MCNC: 0.89 MG/DL — SIGNIFICANT CHANGE UP (ref 0.5–1.3)
CULTURE RESULTS: NO GROWTH — SIGNIFICANT CHANGE UP
EGFR: 120 ML/MIN/1.73M2 — SIGNIFICANT CHANGE UP
EOSINOPHIL # BLD AUTO: 0.05 K/UL — SIGNIFICANT CHANGE UP (ref 0–0.5)
EOSINOPHIL NFR BLD AUTO: 0.7 % — SIGNIFICANT CHANGE UP (ref 0–6)
GLUCOSE SERPL-MCNC: 97 MG/DL — SIGNIFICANT CHANGE UP (ref 70–99)
HAV IGM SER-ACNC: SIGNIFICANT CHANGE UP
HBV CORE IGM SER-ACNC: SIGNIFICANT CHANGE UP
HBV SURFACE AG SER-ACNC: SIGNIFICANT CHANGE UP
HCT VFR BLD CALC: 37.5 % — LOW (ref 39–50)
HCV AB S/CO SERPL IA: 0.12 S/CO — SIGNIFICANT CHANGE UP (ref 0–0.99)
HCV AB SERPL-IMP: SIGNIFICANT CHANGE UP
HETEROPH AB TITR SER AGGL: NEGATIVE — SIGNIFICANT CHANGE UP
HGB BLD-MCNC: 12.8 G/DL — LOW (ref 13–17)
HIV 1+2 AB+HIV1 P24 AG SERPL QL IA: SIGNIFICANT CHANGE UP
IMM GRANULOCYTES NFR BLD AUTO: 0.3 % — SIGNIFICANT CHANGE UP (ref 0–0.9)
LYMPHOCYTES # BLD AUTO: 4.85 K/UL — HIGH (ref 1–3.3)
LYMPHOCYTES # BLD AUTO: 65.4 % — HIGH (ref 13–44)
MCHC RBC-ENTMCNC: 29.7 PG — SIGNIFICANT CHANGE UP (ref 27–34)
MCHC RBC-ENTMCNC: 34.1 GM/DL — SIGNIFICANT CHANGE UP (ref 32–36)
MCV RBC AUTO: 87 FL — SIGNIFICANT CHANGE UP (ref 80–100)
MONOCYTES # BLD AUTO: 0.58 K/UL — SIGNIFICANT CHANGE UP (ref 0–0.9)
MONOCYTES NFR BLD AUTO: 7.8 % — SIGNIFICANT CHANGE UP (ref 2–14)
NEUTROPHILS # BLD AUTO: 1.83 K/UL — SIGNIFICANT CHANGE UP (ref 1.8–7.4)
NEUTROPHILS NFR BLD AUTO: 24.6 % — LOW (ref 43–77)
PLATELET # BLD AUTO: 193 K/UL — SIGNIFICANT CHANGE UP (ref 150–400)
POTASSIUM SERPL-MCNC: 4 MMOL/L — SIGNIFICANT CHANGE UP (ref 3.5–5.3)
POTASSIUM SERPL-SCNC: 4 MMOL/L — SIGNIFICANT CHANGE UP (ref 3.5–5.3)
PROT SERPL-MCNC: 5.9 G/DL — LOW (ref 6–8.3)
PROT SERPL-MCNC: 5.9 G/DL — LOW (ref 6–8.3)
PROT SERPL-MCNC: 6.3 GM/DL — SIGNIFICANT CHANGE UP (ref 6–8.3)
RBC # BLD: 4.31 M/UL — SIGNIFICANT CHANGE UP (ref 4.2–5.8)
RBC # FLD: 12.7 % — SIGNIFICANT CHANGE UP (ref 10.3–14.5)
RHEUMATOID FACT SERPL-ACNC: <10 IU/ML — SIGNIFICANT CHANGE UP (ref 0–13)
SODIUM SERPL-SCNC: 137 MMOL/L — SIGNIFICANT CHANGE UP (ref 135–145)
SPECIMEN SOURCE: SIGNIFICANT CHANGE UP
WBC # BLD: 7.42 K/UL — SIGNIFICANT CHANGE UP (ref 3.8–10.5)
WBC # FLD AUTO: 7.42 K/UL — SIGNIFICANT CHANGE UP (ref 3.8–10.5)

## 2023-07-08 PROCEDURE — 93306 TTE W/DOPPLER COMPLETE: CPT | Mod: 26

## 2023-07-08 PROCEDURE — 99233 SBSQ HOSP IP/OBS HIGH 50: CPT

## 2023-07-08 RX ORDER — SODIUM CHLORIDE 9 MG/ML
1000 INJECTION, SOLUTION INTRAVENOUS
Refills: 0 | Status: DISCONTINUED | OUTPATIENT
Start: 2023-07-08 | End: 2023-07-11

## 2023-07-08 RX ORDER — SODIUM CHLORIDE 9 MG/ML
1000 INJECTION, SOLUTION INTRAVENOUS
Refills: 0 | Status: COMPLETED | OUTPATIENT
Start: 2023-07-08 | End: 2023-07-08

## 2023-07-08 RX ADMIN — Medication 100 MILLIGRAM(S): at 22:28

## 2023-07-08 RX ADMIN — SODIUM CHLORIDE 125 MILLILITER(S): 9 INJECTION, SOLUTION INTRAVENOUS at 11:42

## 2023-07-08 RX ADMIN — SODIUM CHLORIDE 125 MILLILITER(S): 9 INJECTION, SOLUTION INTRAVENOUS at 18:59

## 2023-07-08 RX ADMIN — Medication 650 MILLIGRAM(S): at 20:03

## 2023-07-08 RX ADMIN — SODIUM CHLORIDE 75 MILLILITER(S): 9 INJECTION, SOLUTION INTRAVENOUS at 02:52

## 2023-07-08 RX ADMIN — Medication 100 MILLIGRAM(S): at 10:17

## 2023-07-08 RX ADMIN — Medication 650 MILLIGRAM(S): at 19:33

## 2023-07-08 RX ADMIN — Medication 650 MILLIGRAM(S): at 02:48

## 2023-07-08 RX ADMIN — Medication 650 MILLIGRAM(S): at 02:18

## 2023-07-08 NOTE — CONSULT NOTE ADULT - SUBJECTIVE AND OBJECTIVE BOX
Patient is a 28y old  Male who presents with a chief complaint of Intractable Fevers (07 Jul 2023 22:07)    HPI:  28 year old male no known PMHx presents to the [facility] for further evaluation and management of a 2 week history of intractable fevers (TMax 102.8'F) despite taking Acetaminophen at home. The patient was reportedly evaluated last week in the [facility]. Workup including blood work were reportedly negative. The patient was then evaluated by his PCP on Wednesday where the patient had intractable vomiting. The patient was referred to [facility] at that time where the patient reportedly had a negative infectious workup there as well. A CT scan revealed splenomegaly. The patient additionally c/o a 2 week history of headaches. He denies any associated chest pain, palpitations, shortness of breath, abdominal pain, nausea, or recent sick contacts. Vital signs in triage => /76, HR 83/min, RR 18/min, TMax 100.9'F, SpO2 99% on room air. Labs => lymph% 51, AST/ALT 78/253. CXR => No radiographic evidence of active chest disease. In the ED the patient was given Acetaminophen 650mg PO x 1, and NS x 1L.        PMH: as above  PSH: as above  Meds: per reconciliation sheet, noted below  MEDICATIONS  (STANDING):  dextrose 5% + sodium chloride 0.9%. 1000 milliLiter(s) (125 mL/Hr) IV Continuous <Continuous>  doxycycline monohydrate Capsule 100 milliGRAM(s) Oral every 12 hours      Allergies    No Known Allergies    Intolerances      Social: no smoking, no alcohol, no illegal drugs; no recent travel, no exposure to TB  FAMILY HISTORY:  No pertinent family history in first degree relatives       no history of premature cardiovascular disease in first degree relatives    ROS:+ fever, chills, no HA, no dizziness, no sore throat, no blurry vision, no CP, no palpitations, no SOB, no cough, no abdominal pain, no diarrhea, no N/V, no dysuria, no leg pain, no claudication, no rash, no joint aches, no rectal pain or bleeding, no night sweats    All other systems reviewed and are negative    Vital Signs Last 24 Hrs  T(C): 37.5 (08 Jul 2023 08:43), Max: 39.2 (08 Jul 2023 02:25)  T(F): 99.5 (08 Jul 2023 08:43), Max: 102.6 (08 Jul 2023 02:25)  HR: 81 (08 Jul 2023 08:43) (76 - 83)  BP: 112/73 (08 Jul 2023 08:43) (108/76 - 137/69)  BP(mean): 82 (07 Jul 2023 20:54) (82 - 87)  RR: 18 (08 Jul 2023 08:43) (16 - 18)  SpO2: 97% (08 Jul 2023 08:43) (97% - 99%)    Parameters below as of 08 Jul 2023 08:43  Patient On (Oxygen Delivery Method): room air      Daily Height in cm: 182.88 (07 Jul 2023 13:44)    Daily     PE:  Constitutional: NAD   HEENT: NC/AT, EOMI, PERRLA, conjunctivae clear; ears and nose atraumatic; pharynx benign  Neck: supple; thyroid not palpable  Back: no tenderness  Respiratory: respiratory effort normal; clear to auscultation  Cardiovascular: S1S2 regular, no murmurs  Abdomen: soft, not tender, not distended, positive BS; liver and spleen WNL  Genitourinary: no suprapubic tenderness  Lymphatic: no LN palpable  Musculoskeletal: no muscle tenderness, no joint swelling or tenderness  Extremities: no pedal edema  Neurological/ Psychiatric: AxOx3, Judgement and insight normal;  moving all extremities  Skin: no rashes; no palpable lesions    Labs: all available labs reviewed                        12.8   7.42  )-----------( 193      ( 08 Jul 2023 08:23 )             37.5     07-08    137  |  106  |  9   ----------------------------<  97  4.0   |  26  |  0.89    Ca    8.3<L>      08 Jul 2023 08:23    TPro  6.3  /  Alb  3.0<L>  /  TBili  0.6  /  DBili  x   /  AST  84<H>  /  ALT  232<H>  /  AlkPhos  105  07-08     LIVER FUNCTIONS - ( 08 Jul 2023 08:23 )  Alb: 3.0 g/dL / Pro: 6.3 gm/dL / ALK PHOS: 105 U/L / ALT: 232 U/L / AST: 84 U/L / GGT: x           Urinalysis Basic - ( 08 Jul 2023 08:23 )    Color: x / Appearance: x / SG: x / pH: x  Gluc: 97 mg/dL / Ketone: x  / Bili: x / Urobili: x   Blood: x / Protein: x / Nitrite: x   Leuk Esterase: x / RBC: x / WBC x   Sq Epi: x / Non Sq Epi: x / Bacteria: x          Radiology: all available radiological tests reviewed  < from: Xray Chest 2 Views PA/Lat (07.07.23 @ 16:00) >    ACC: [id number] EXAM:  XR CHEST PA LAT 2V   ORDERED BY: FROILAN YUSUF     PROCEDURE DATE:  07/07/2023          INTERPRETATION:  PA and lateral chest radiographs    COMPARISON:  NONE.    CLINICAL INFORMATION: Sepsis.    FINDINGS:    PULMONARY: The airway is midline.  There are no airspace consolidations or radiographic evidence of   pulmonary nodules..  No pleural effusion or pneumothorax.    HEART/VASCULAR: The heart size and mediastinum configuration are within   the limits of normal.    BONES: The visualized osseous thorax is intact.    IMPRESSION:    No radiographic evidence of active chest disease..    --- End of Report ---      < end of copied text >    Advanced directives addressed: full resuscitation

## 2023-07-08 NOTE — PROGRESS NOTE ADULT - SUBJECTIVE AND OBJECTIVE BOX
HPI: 28 year old male no known PMHx presents to the [facility] for further evaluation and management of a 2 week history of intractable fevers (TMax 102.8'F) despite taking Acetaminophen at home. The patient was reportedly evaluated last week in the [facility]. Workup including blood work were reportedly negative. The patient was then evaluated by his PCP on Wednesday where the patient had intractable vomiting. The patient was referred to [facility] at that time where the patient reportedly had a negative infectious workup there as well. A CT scan revealed splenomegaly. The patient additionally c/o a 2 week history of headaches. He denies any associated chest pain, palpitations, shortness of breath, abdominal pain, nausea, or recent sick contacts. Vital signs in triage => /76, HR 83/min, RR 18/min, TMax 100.9'F, SpO2 99% on room air. Labs => lymph% 51, AST/ALT 78/253. CXR => No radiographic evidence of active chest disease. In the ED the patient was given Acetaminophen 650mg PO x 1, and NS x 1L.    7/8: having recurring fevers around 102 every night  no recent travel  doesn't know if had tick bite  works as [employer] officer  spider bite 2 months ago  CT scans at [place] show: splenomegaly and 5 mm polyploid lesion in upper trachea      PHYSICAL EXAM:    Daily     Daily     Vital Signs Last 24 Hrs  T(C): 38.1 (08 Jul 2023 13:51), Max: 39.2 (08 Jul 2023 02:25)  T(F): 100.6 (08 Jul 2023 13:51), Max: 102.6 (08 Jul 2023 02:25)  HR: 81 (08 Jul 2023 08:43) (76 - 81)  BP: 112/73 (08 Jul 2023 08:43) (112/73 - 137/69)  BP(mean): 82 (07 Jul 2023 20:54) (82 - 82)  RR: 18 (08 Jul 2023 08:43) (16 - 18)  SpO2: 97% (08 Jul 2023 08:43) (97% - 99%)    Constitutional: Weak and ill appearing  HEENT: Atraumatic, JOSE,   Respiratory: Breath Sounds normal, no rhonchi/wheeze  Cardiovascular: N S1S2;   Gastrointestinal: Abdomen soft, non tender, Bowel Sounds present  Extremities: No edema, peripheral pulses present  Neurological: AAO x 3, no gross focal motor deficits  Skin: Non cellulitic, no rash, ulcers  Lymph Nodes: No lymphadenopathy noted  Back: No CVA tenderness   Musculoskeletal: non tender  Breasts: Deferred  Genitourinary: deferred  Rectal: Deferred    All Labs/EKG/Radiology/Meds reviewed by me                          12.8   7.42  )-----------( 193      ( 08 Jul 2023 08:23 )             37.5       CBC Full  -  ( 08 Jul 2023 08:23 )  WBC Count : 7.42 K/uL  RBC Count : 4.31 M/uL  Hemoglobin : 12.8 g/dL  Hematocrit : 37.5 %  Platelet Count - Automated : 193 K/uL  Mean Cell Volume : 87.0 fl  Mean Cell Hemoglobin : 29.7 pg  Mean Cell Hemoglobin Concentration : 34.1 gm/dL  Auto Neutrophil # : 1.83 K/uL  Auto Lymphocyte # : 4.85 K/uL  Auto Monocyte # : 0.58 K/uL  Auto Eosinophil # : 0.05 K/uL  Auto Basophil # : 0.09 K/uL  Auto Neutrophil % : 24.6 %  Auto Lymphocyte % : 65.4 %  Auto Monocyte % : 7.8 %  Auto Eosinophil % : 0.7 %  Auto Basophil % : 1.2 %      07-08    137  |  106  |  9   ----------------------------<  97  4.0   |  26  |  0.89    Ca    8.3<L>      08 Jul 2023 08:23    TPro  6.3  /  Alb  3.0<L>  /  TBili  0.6  /  DBili  x   /  AST  84<H>  /  ALT  232<H>  /  AlkPhos  105  07-08      LIVER FUNCTIONS - ( 08 Jul 2023 08:23 )  Alb: 3.0 g/dL / Pro: 6.3 gm/dL / ALK PHOS: 105 U/L / ALT: 232 U/L / AST: 84 U/L / GGT: x             PT/INR - ( 07 Jul 2023 16:19 )   PT: 13.7 sec;   INR: 1.18 ratio         PTT - ( 07 Jul 2023 16:19 )  PTT:34.0 sec          Urinalysis Basic - ( 08 Jul 2023 08:23 )    Color: x / Appearance: x / SG: x / pH: x  Gluc: 97 mg/dL / Ketone: x  / Bili: x / Urobili: x   Blood: x / Protein: x / Nitrite: x   Leuk Esterase: x / RBC: x / WBC x   Sq Epi: x / Non Sq Epi: x / Bacteria: x            MEDICATIONS  (STANDING):  dextrose 5% + sodium chloride 0.9%. 1000 milliLiter(s) (125 mL/Hr) IV Continuous <Continuous>  doxycycline monohydrate Capsule 100 milliGRAM(s) Oral every 12 hours    MEDICATIONS  (PRN):  acetaminophen     Tablet .. 650 milliGRAM(s) Oral every 6 hours PRN Temp greater or equal to 38C (100.4F), Mild Pain (1 - 3)  aluminum hydroxide/magnesium hydroxide/simethicone Suspension 30 milliLiter(s) Oral every 4 hours PRN Dyspepsia  melatonin 3 milliGRAM(s) Oral at bedtime PRN Insomnia  ondansetron Injectable 4 milliGRAM(s) IV Push every 8 hours PRN Nausea and/or Vomiting

## 2023-07-08 NOTE — CONSULT NOTE ADULT - ASSESSMENT
28 year old male no known PMHx presents to the [facility] for further evaluation and management of a 2 week history of intractable fevers (TMax 102.8'F) despite taking Acetaminophen at home. The patient was reportedly evaluated last week in the [facility]. Workup including blood work were reportedly negative. The patient was then evaluated by his PCP on Wednesday where the patient had intractable vomiting. The patient was referred to [facility] at that time where the patient reportedly had a negative infectious workup there as well. A CT scan revealed splenomegaly. The patient additionally c/o a 2 week history of headaches. He denies any associated chest pain, palpitations, shortness of breath, abdominal pain, nausea, or recent sick contacts. Vital signs in triage => /76, HR 83/min, RR 18/min, TMax 100.9'F, SpO2 99% on room air. Labs => lymph% 51, AST/ALT 78/253. CXR => No radiographic evidence of active chest disease. In the ED the patient was given Acetaminophen 650mg PO x 1, and NS x 1L.    1. Fever. Malaise. Splenomegaly. Transamnitis ? Tick borne disease  - babesia pcr (-) f/u lyme screen, ehrlichia, anaplasma pcr  - start empiric doxycycline   - monitor temps  - f/u cultures   - tolerating abx well so far; no side effects noted  - reason for abx use and side effects reviewed with patient  - supportive care  - fu cbc    2. other issues - care per medicine

## 2023-07-08 NOTE — PROGRESS NOTE ADULT - ASSESSMENT
28 year old male no known PMHx presents to the [facility] for further evaluation and management of a 2 week history of intractable fevers (TMax 102.8'F) despite taking Acetaminophen at home. The patient was reportedly evaluated last week in the [facility]. Workup including blood work were reportedly negative. The patient was then evaluated by his PCP on Wednesday where the patient had intractable vomiting. The patient was referred to [facility] at that time where the patient reportedly had a negative infectious workup there as well. A CT scan revealed splenomegaly. The patient additionally c/o a 2 week history of headaches. He denies any associated chest pain, palpitations, shortness of breath, abdominal pain, nausea, or recent sick contacts. Vital signs in triage => /76, HR 83/min, RR 18/min, TMax 100.9'F, SpO2 99% on room air. Labs => lymph% 51, AST/ALT 78/253. CXR => No radiographic evidence of active chest disease. In the ED the patient was given Acetaminophen 650mg PO x 1, and NS x 1L.    #Fevers of Unknown Origin  ~admit to Medicine  ~DDx includes infections, malignancies, and systemic rheumatic diseases  ~f/u Blood C+S  ~f/u Fungal C+S  ~f/u Urinalysis  ~f/u Urine C+S  ~f/u Lyme Vise IgG/IgM AB Reflex  ~f/u Babesia microti PCR  ~f/u Enrlichea/Anaplasma PCR  ~f/u Viral Hepatitis profile  ~f/u ESR/CRP/MARISOL  ~f/u rheumatoid factor  ~heterophile antibody test for mononucleosis  ~f/u tuberculin skin test or interferon-gamma release assay  ~f/u serum protein electrophoresis  ~f/u HIV AB testing  ~cont. anti-pyretics prn  ~cont. 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 SCDs for now     poc discussed with ptkarmen, team.

## 2023-07-09 LAB
ALBUMIN SERPL ELPH-MCNC: 2.8 G/DL — LOW (ref 3.3–5)
ALP SERPL-CCNC: 97 U/L — SIGNIFICANT CHANGE UP (ref 40–120)
ALT FLD-CCNC: 240 U/L — HIGH (ref 12–78)
ANION GAP SERPL CALC-SCNC: 4 MMOL/L — LOW (ref 5–17)
AST SERPL-CCNC: 91 U/L — HIGH (ref 15–37)
BILIRUB SERPL-MCNC: 0.6 MG/DL — SIGNIFICANT CHANGE UP (ref 0.2–1.2)
BUN SERPL-MCNC: 9 MG/DL — SIGNIFICANT CHANGE UP (ref 7–23)
CALCIUM SERPL-MCNC: 8.3 MG/DL — LOW (ref 8.5–10.1)
CHLORIDE SERPL-SCNC: 109 MMOL/L — HIGH (ref 96–108)
CO2 SERPL-SCNC: 26 MMOL/L — SIGNIFICANT CHANGE UP (ref 22–31)
CREAT SERPL-MCNC: 0.89 MG/DL — SIGNIFICANT CHANGE UP (ref 0.5–1.3)
EGFR: 120 ML/MIN/1.73M2 — SIGNIFICANT CHANGE UP
GI PCR PANEL: SIGNIFICANT CHANGE UP
GLUCOSE SERPL-MCNC: 99 MG/DL — SIGNIFICANT CHANGE UP (ref 70–99)
HCT VFR BLD CALC: 35.9 % — LOW (ref 39–50)
HGB BLD-MCNC: 12.2 G/DL — LOW (ref 13–17)
MCHC RBC-ENTMCNC: 29.8 PG — SIGNIFICANT CHANGE UP (ref 27–34)
MCHC RBC-ENTMCNC: 34 GM/DL — SIGNIFICANT CHANGE UP (ref 32–36)
MCV RBC AUTO: 87.8 FL — SIGNIFICANT CHANGE UP (ref 80–100)
PLATELET # BLD AUTO: 200 K/UL — SIGNIFICANT CHANGE UP (ref 150–400)
POTASSIUM SERPL-MCNC: 4.2 MMOL/L — SIGNIFICANT CHANGE UP (ref 3.5–5.3)
POTASSIUM SERPL-SCNC: 4.2 MMOL/L — SIGNIFICANT CHANGE UP (ref 3.5–5.3)
PROT SERPL-MCNC: 5.9 GM/DL — LOW (ref 6–8.3)
RBC # BLD: 4.09 M/UL — LOW (ref 4.2–5.8)
RBC # FLD: 13 % — SIGNIFICANT CHANGE UP (ref 10.3–14.5)
SODIUM SERPL-SCNC: 139 MMOL/L — SIGNIFICANT CHANGE UP (ref 135–145)
WBC # BLD: 8.75 K/UL — SIGNIFICANT CHANGE UP (ref 3.8–10.5)
WBC # FLD AUTO: 8.75 K/UL — SIGNIFICANT CHANGE UP (ref 3.8–10.5)

## 2023-07-09 PROCEDURE — 99233 SBSQ HOSP IP/OBS HIGH 50: CPT

## 2023-07-09 RX ADMIN — SODIUM CHLORIDE 125 MILLILITER(S): 9 INJECTION, SOLUTION INTRAVENOUS at 03:00

## 2023-07-09 RX ADMIN — Medication 650 MILLIGRAM(S): at 18:02

## 2023-07-09 RX ADMIN — Medication 100 MILLIGRAM(S): at 20:56

## 2023-07-09 RX ADMIN — Medication 100 MILLIGRAM(S): at 09:02

## 2023-07-09 RX ADMIN — SODIUM CHLORIDE 125 MILLILITER(S): 9 INJECTION, SOLUTION INTRAVENOUS at 18:02

## 2023-07-09 NOTE — CONSULT NOTE ADULT - SUBJECTIVE AND OBJECTIVE BOX
Patient is a 28y old  Male who presents with a chief complaint of Intractable Fevers (08 Jul 2023 14:52)      HPI:  28 year old male no known PMHx presents to the [facility] for further evaluation and management of a 2 week history of intractable fevers (TMax 102.8'F) despite taking Acetaminophen at home. The patient was reportedly evaluated last week in the [facility]. Workup including blood work were reportedly negative. The patient was then evaluated by his PCP on Wednesday where the patient had intractable vomiting. The patient was referred to [facility] at that time where the patient reportedly had a negative infectious workup there as well. A CT scan revealed splenomegaly. The patient additionally c/o a 2 week history of headaches. He denies any associated chest pain, palpitations, shortness of breath, abdominal pain, nausea, or recent sick contacts. Vital signs in triage => /76, HR 83/min, RR 18/min, TMax 100.9'F, SpO2 99% on room air. Labs => lymph% 51, AST/ALT 78/253. CXR => No radiographic evidence of active chest disease. In the ED the patient was given Acetaminophen 650mg PO x 1, and NS x 1L.   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 1000 milliLiter(s) (125 mL/Hr) IV Continuous <Continuous>  doxycycline monohydrate Capsule 100 milliGRAM(s) Oral every 12 hours    MEDICATIONS  (PRN):  acetaminophen     Tablet .. 650 milliGRAM(s) Oral every 6 hours PRN Temp greater or equal to 38C (100.4F), Mild Pain (1 - 3)  aluminum hydroxide/magnesium hydroxide/simethicone Suspension 30 milliLiter(s) Oral every 4 hours PRN Dyspepsia  melatonin 3 milliGRAM(s) Oral at bedtime PRN Insomnia  ondansetron Injectable 4 milliGRAM(s) IV Push every 8 hours PRN Nausea and/or Vomiting      FAMILY HISTORY:  No pertinent family history in first degree relatives        SOCIAL HISTORY:  ***    REVIEW OF SYSTEM:  Pertinent items are noted in HPI.  Constitutional negative for chills, fevers, sweats and weight loss  throat, and face:  negative for epistaxis, nasal congestion, sore throat and   tinnitus  Respiratory: negative for cough, dyspnea on exertion, pleuritic chest pain  and wheezing  Cardiovascular:  negative for chest pain, dyspnea and palpitations  Gastrointestinal: negative for abdominal pain, diarrhea, nausea and vomiting  Genitourinary: negative for dysuria, frequency and urinary incontinence  Skin:  negative for redness, rash, pruritus, swelling, dryness and   fissures  Hematologic/lymphatic: negative for bleeding and easy bruising  Musculoskeletal: negative for arthralgias, back pain and muscle weakness  Neurological: negative for dizziness, headaches, seizures and tremors  Behavioral/Psych:  negative for mood change, depression, suicidal attempts    Allergic/Immunologic: negative for anaphylaxis, angioedema and urticaria    Vital Signs Last 24 Hrs  T(C): 37.1 (09 Jul 2023 08:23), Max: 38.4 (08 Jul 2023 19:33)  T(F): 98.8 (09 Jul 2023 08:23), Max: 101.2 (08 Jul 2023 19:33)  HR: 77 (09 Jul 2023 08:23) (74 - 94)  BP: 109/60 (09 Jul 2023 08:23) (103/69 - 115/74)  BP(mean): --  RR: 18 (09 Jul 2023 08:23) (18 - 18)  SpO2: 94% (09 Jul 2023 08:23) (94% - 98%)    Parameters below as of 09 Jul 2023 08:23  Patient On (Oxygen Delivery Method): room air        I&O's Summary    08 Jul 2023 07:01  -  09 Jul 2023 07:00  --------------------------------------------------------  IN: 2650 mL / OUT: 0 mL / NET: 2650 mL      PHYSICAL EXAM  General Appearance: cooperative, no acute distress,   HEENT: PERRL, conjunctiva clear, EOM's intact, non injected pharynx, no exudate, TM   normal  Neck: Supple, , no adenopathy, thyroid: not enlarged, no carotid bruit or JVD  Back: Symmetric, no  tenderness,no soft tissue tenderness  Lungs: Clear to auscultation bilateral,no adventitious breath sounds, normal   expiratory phase  Heart: Regular rate and rhythm, S1, S2 normal, no murmur, rub or gallop  Abdomen: Soft, non-tender, bowel sounds active , no hepatosplenomegaly  Extremities: no cyanosis or edema, no joint swelling  Skin: Skin color, texture normal, no rashes   Neurologic: Alert and oriented X3 , cranial nerves intact, sensory and motor normal,    ECG:    LABS:                          12.2   8.75  )-----------( 200      ( 09 Jul 2023 08:38 )             35.9     07-09    139  |  109<H>  |  9   ----------------------------<  99  4.2   |  26  |  0.89    Ca    8.3<L>      09 Jul 2023 07:43    TPro  5.9<L>  /  Alb  2.8<L>  /  TBili  0.6  /  DBili  x   /  AST  91<H>  /  ALT  240<H>  /  AlkPhos  97  07-09            PT/INR - ( 07 Jul 2023 16:19 )   PT: 13.7 sec;   INR: 1.18 ratio         PTT - ( 07 Jul 2023 16:19 )  PTT:34.0 sec  Urinalysis Basic - ( 09 Jul 2023 07:43 )    Color: x / Appearance: x / SG: x / pH: x  Gluc: 99 mg/dL / Ketone: x  / Bili: x / Urobili: x   Blood: x / Protein: x / Nitrite: x   Leuk Esterase: x / RBC: x / WBC x   Sq Epi: x / Non Sq Epi: x / Bacteria: x            RADIOLOGY & ADDITIONAL STUDIES:

## 2023-07-09 NOTE — CONSULT NOTE ADULT - SUBJECTIVE AND OBJECTIVE BOX
CC:     BRIEF HOSPITAL COURSE: ***    Events last 24 hours: ***    ROS:   CONSTITUTIONAL: (-) fever, (-) chills  RESPIRATORY: (-) SOB, (-) cough  CV: (-) chest pain, (-) palpitations  GI: (-) abdominal pain, (-) nausea, (-) vomiting, (-) diarrhea, (-) constipation   NEURO: (-) headache, (-) weakness, (-) visual changes    [  ] Due to altered mental status/intubation, subjective information was not able to be obtained from the patient. History was obtained, to the extent possible, from review of the chart and collateral sources of information.    PMHx:   PSHx:  FamHx:   Social Hx:   Allergies:    PAST MEDICAL & SURGICAL HISTORY:  No pertinent past medical history      No significant past surgical history          SOCIAL HISTORY:  EtOH:   Smoking:   Recreational drug use:     Medications:  doxycycline monohydrate Capsule 100 milliGRAM(s) Oral every 12 hours        acetaminophen     Tablet .. 650 milliGRAM(s) Oral every 6 hours PRN  melatonin 3 milliGRAM(s) Oral at bedtime PRN  ondansetron Injectable 4 milliGRAM(s) IV Push every 8 hours PRN        aluminum hydroxide/magnesium hydroxide/simethicone Suspension 30 milliLiter(s) Oral every 4 hours PRN        dextrose 5% + sodium chloride 0.9%. 1000 milliLiter(s) IV Continuous <Continuous>                ICU Vital Signs Last 24 Hrs  T(C): 38.6 (09 Jul 2023 17:57), Max: 38.6 (09 Jul 2023 17:57)  T(F): 101.4 (09 Jul 2023 17:57), Max: 101.4 (09 Jul 2023 17:57)  HR: 84 (09 Jul 2023 17:57) (74 - 94)  BP: 118/71 (09 Jul 2023 17:57) (103/69 - 125/79)  BP(mean): --  ABP: --  ABP(mean): --  RR: 18 (09 Jul 2023 17:57) (18 - 18)  SpO2: 99% (09 Jul 2023 17:57) (94% - 100%)    O2 Parameters below as of 09 Jul 2023 17:57  Patient On (Oxygen Delivery Method): room air            Physical Examination:    General: Laying in bed comfortably in no acute distress  HEENT: Pupils equal, reactive to light.  Symmetric.  PULM: Clear to auscultation bilaterally, unlabored respirations on room air   CVS: Regular rate and rhythm, no murmurs, rubs, or gallops  ABD: Soft, nondistended, nontender, normoactive bowel sounds, no masses  EXT: No edema, nontender  SKIN: Warm and well perfused  NEURO: interactive, nonfocal         I&O's Detail    08 Jul 2023 07:01  -  09 Jul 2023 07:00  --------------------------------------------------------  IN:    dextrose 5% + sodium chloride 0.9%: 2500 mL    Lactated Ringers: 150 mL  Total IN: 2650 mL    OUT:  Total OUT: 0 mL    Total NET: 2650 mL          LABS:                        12.2   8.75  )-----------( 200      ( 09 Jul 2023 08:38 )             35.9     07-09    139  |  109<H>  |  9   ----------------------------<  99  4.2   |  26  |  0.89    Ca    8.3<L>      09 Jul 2023 07:43    TPro  5.9<L>  /  Alb  2.8<L>  /  TBili  0.6  /  DBili  x   /  AST  91<H>  /  ALT  240<H>  /  AlkPhos  97  07-09          CAPILLARY BLOOD GLUCOSE          Urinalysis Basic - ( 09 Jul 2023 07:43 )    Color: x / Appearance: x / SG: x / pH: x  Gluc: 99 mg/dL / Ketone: x  / Bili: x / Urobili: x   Blood: x / Protein: x / Nitrite: x   Leuk Esterase: x / RBC: x / WBC x   Sq Epi: x / Non Sq Epi: x / Bacteria: x      CULTURES:  Rapid RVP Result: Anatec (07-07 @ 16:32)  Culture Results:   No growth at 24 hours (07-07 @ 16:32)  Culture Results:   No growth at 24 hours (07-07 @ 16:19)  Culture Results:   No growth (07-07 @ 16:19)        RADIOLOGY: ***      INVASIVE LINES:  INDWELLING AGUIRRE:  VTE PROPHYLAXIS:  CODE STATUS:    [id number] (40 +)  45083 (55+)  77437 (75+)    49343 (25+)  [id number] (35+)  18650 (50+)    Time spent on this patient encounter, which includes documenting this note in the electronic medical record, was * minutes including assessing the presenting problems with associated risks, reviewing the medical record to prepare for the encounter, and meeting face to face with the patient to obtain additional history. I have also performed an appropriate physical exam, made interventions listed and ordered and interpreted appropriate diagnostic studies as documented. To improve communication and patient safety, I have coordinated care with the multidisciplinary team including the bedside nurse, appropriate attending of record  and consultants as needed    CRITICAL CARE TIME SPENT: *** minutes spent performing frequent bedside reassessments and augmenting plan of care to address problems of acute critical illness that pose high probability of life threatening deterioration and/or end organ damage/dysfunction and discussing goals of care, non-inclusive of time spent on procedures performed. CC: JD    [facility] HOSPITAL COURSE: 28m with no known pmhx came to the ED for further eval of intractable fevers x2 weeks.     Events last 24 hours: ***    ROS:   CONSTITUTIONAL: (-) fever, (-) chills  RESPIRATORY: (-) SOB, (-) cough  CV: (-) chest pain, (-) palpitations  GI: (-) abdominal pain, (-) nausea, (-) vomiting, (-) diarrhea, (-) constipation   NEURO: (-) headache, (-) weakness, (-) visual changes    [  ] Due to altered mental status/intubation, subjective information was not able to be obtained from the patient. History was obtained, to the extent possible, from review of the chart and collateral sources of information.    PMHx:   PSHx:  FamHx:   Social Hx:   Allergies:    PAST MEDICAL & SURGICAL HISTORY:  No pertinent past medical history      No significant past surgical history          SOCIAL HISTORY:  EtOH:   Smoking:   Recreational drug use:     Medications:  doxycycline monohydrate Capsule 100 milliGRAM(s) Oral every 12 hours        acetaminophen     Tablet .. 650 milliGRAM(s) Oral every 6 hours PRN  melatonin 3 milliGRAM(s) Oral at bedtime PRN  ondansetron Injectable 4 milliGRAM(s) IV Push every 8 hours PRN        aluminum hydroxide/magnesium hydroxide/simethicone Suspension 30 milliLiter(s) Oral every 4 hours PRN        dextrose 5% + sodium chloride 0.9%. 1000 milliLiter(s) IV Continuous <Continuous>                ICU Vital Signs Last 24 Hrs  T(C): 38.6 (09 Jul 2023 17:57), Max: 38.6 (09 Jul 2023 17:57)  T(F): 101.4 (09 Jul 2023 17:57), Max: 101.4 (09 Jul 2023 17:57)  HR: 84 (09 Jul 2023 17:57) (74 - 94)  BP: 118/71 (09 Jul 2023 17:57) (103/69 - 125/79)  BP(mean): --  ABP: --  ABP(mean): --  RR: 18 (09 Jul 2023 17:57) (18 - 18)  SpO2: 99% (09 Jul 2023 17:57) (94% - 100%)    O2 Parameters below as of 09 Jul 2023 17:57  Patient On (Oxygen Delivery Method): room air            Physical Examination:    General: Laying in bed comfortably in no acute distress  HEENT: Pupils equal, reactive to light.  Symmetric.  PULM: Clear to auscultation bilaterally, unlabored respirations on room air   CVS: Regular rate and rhythm, no murmurs, rubs, or gallops  ABD: Soft, nondistended, nontender, normoactive bowel sounds, no masses  EXT: No edema, nontender  SKIN: Warm and well perfused  NEURO: interactive, nonfocal         I&O's Detail    08 Jul 2023 07:01  -  09 Jul 2023 07:00  --------------------------------------------------------  IN:    dextrose 5% + sodium chloride 0.9%: 2500 mL    Lactated Ringers: 150 mL  Total IN: 2650 mL    OUT:  Total OUT: 0 mL    Total NET: 2650 mL          LABS:                        12.2   8.75  )-----------( 200      ( 09 Jul 2023 08:38 )             35.9     07-09    139  |  109<H>  |  9   ----------------------------<  99  4.2   |  26  |  0.89    Ca    8.3<L>      09 Jul 2023 07:43    TPro  5.9<L>  /  Alb  2.8<L>  /  TBili  0.6  /  DBili  x   /  AST  91<H>  /  ALT  240<H>  /  AlkPhos  97  07-09          CAPILLARY BLOOD GLUCOSE          Urinalysis Basic - ( 09 Jul 2023 07:43 )    Color: x / Appearance: x / SG: x / pH: x  Gluc: 99 mg/dL / Ketone: x  / Bili: x / Urobili: x   Blood: x / Protein: x / Nitrite: x   Leuk Esterase: x / RBC: x / WBC x   Sq Epi: x / Non Sq Epi: x / Bacteria: x      CULTURES:  Rapid RVP Result: NotDetec (07-07 @ 16:32)  Culture Results:   No growth at 24 hours (07-07 @ 16:32)  Culture Results:   No growth at 24 hours (07-07 @ 16:19)  Culture Results:   No growth (07-07 @ 16:19)        RADIOLOGY: ***      INVASIVE LINES:  INDWELLING AGUIRRE:  VTE PROPHYLAXIS:  CODE STATUS:    [id number] (40 +)  07169 (55+)  38902 (75+)    25992 (25+)  20637 (35+)  98500 (50+)    Time spent on this patient encounter, which includes documenting this note in the electronic medical record, was * minutes including assessing the presenting problems with associated risks, reviewing the medical record to prepare for the encounter, and meeting face to face with the patient to obtain additional history. I have also performed an appropriate physical exam, made interventions listed and ordered and interpreted appropriate diagnostic studies as documented. To improve communication and patient safety, I have coordinated care with the multidisciplinary team including the bedside nurse, appropriate attending of record  and consultants as needed    CRITICAL CARE TIME SPENT: *** minutes spent performing frequent bedside reassessments and augmenting plan of care to address problems of acute critical illness that pose high probability of life threatening deterioration and/or end organ damage/dysfunction and discussing goals of care, non-inclusive of time spent on procedures performed. CC: JD    [facility] HOSPITAL COURSE: 28m with no known pmhx came to the ED for further eval of intractable fevers x2 weeks despite taking APAP. Patient was reportedly evaluated last week in the ED. Workup including blood work were reportedly negative. The patient was then evaluated by his PCP on Wednesday where the patient had intractable vomiting. The patient was referred to [facility] at that time where the patient reportedly had a negative infectious workup there as well. A CT scan revealed splenomegaly. The patient additionally c/o a 2 week history of headaches. He denies any associated chest pain, palpitations, shortness of breath, abdominal pain, nausea, or recent sick contacts. Vital signs in triage => /76, HR 83/min, RR 18/min, TMax 100.9'F, SpO2 99% on room air. Labs => lymph% 51, AST/ALT 78/253. CXR => No radiographic evidence of active chest disease. In the ED the patient was given Acetaminophen 650mg PO x 1, and NS x 1L.   (07 Jul 2023 22:07)    Events last 24 hours: This morning, consulted by pulmonologist for bronch eval given reported 5mm polypoid lesion noted on upper trachea. Pt seen at bedside, states that he feels weak and overall unwell, otherwise without acute complaints.     ROS:   CONSTITUTIONAL: (-) fever, (-) chills  RESPIRATORY: (-) SOB, (-) cough  CV: (-) chest pain, (-) palpitations  GI: (-) abdominal pain, (-) nausea, (-) vomiting, (-) diarrhea, (-) constipation   NEURO: (-) headache, (+) weakness, (-) visual changes    PAST MEDICAL & SURGICAL HISTORY:  No pertinent past medical history  No significant past surgical history    Medications:  doxycycline monohydrate Capsule 100 milliGRAM(s) Oral every 12 hours    acetaminophen     Tablet .. 650 milliGRAM(s) Oral every 6 hours PRN  melatonin 3 milliGRAM(s) Oral at bedtime PRN  ondansetron Injectable 4 milliGRAM(s) IV Push every 8 hours PRN        aluminum hydroxide/magnesium hydroxide/simethicone Suspension 30 milliLiter(s) Oral every 4 hours PRN        dextrose 5% + sodium chloride 0.9%. 1000 milliLiter(s) IV Continuous <Continuous>                ICU Vital Signs Last 24 Hrs  T(C): 38.6 (09 Jul 2023 17:57), Max: 38.6 (09 Jul 2023 17:57)  T(F): 101.4 (09 Jul 2023 17:57), Max: 101.4 (09 Jul 2023 17:57)  HR: 84 (09 Jul 2023 17:57) (74 - 94)  BP: 118/71 (09 Jul 2023 17:57) (103/69 - 125/79)  BP(mean): --  ABP: --  ABP(mean): --  RR: 18 (09 Jul 2023 17:57) (18 - 18)  SpO2: 99% (09 Jul 2023 17:57) (94% - 100%)    O2 Parameters below as of 09 Jul 2023 17:57  Patient On (Oxygen Delivery Method): room air            Physical Examination:    General: Laying in bed comfortably in no acute distress  HEENT: Pupils equal, reactive to light.  Symmetric.  PULM: Clear to auscultation bilaterally, unlabored respirations on room air   CVS: Regular rate and rhythm, no murmurs, rubs, or gallops  ABD: Soft, nondistended, nontender, normoactive bowel sounds, no masses  EXT: No edema, nontender  SKIN: Warm and well perfused  NEURO: interactive, nonfocal         I&O's Detail    08 Jul 2023 07:01  -  09 Jul 2023 07:00  --------------------------------------------------------  IN:    dextrose 5% + sodium chloride 0.9%: 2500 mL    Lactated Ringers: 150 mL  Total IN: 2650 mL    OUT:  Total OUT: 0 mL    Total NET: 2650 mL          LABS:                        12.2   8.75  )-----------( 200      ( 09 Jul 2023 08:38 )             35.9     07-09    139  |  109<H>  |  9   ----------------------------<  99  4.2   |  26  |  0.89    Ca    8.3<L>      09 Jul 2023 07:43    TPro  5.9<L>  /  Alb  2.8<L>  /  TBili  0.6  /  DBili  x   /  AST  91<H>  /  ALT  240<H>  /  AlkPhos  97  07-09          CAPILLARY BLOOD GLUCOSE          Urinalysis Basic - ( 09 Jul 2023 07:43 )    Color: x / Appearance: x / SG: x / pH: x  Gluc: 99 mg/dL / Ketone: x  / Bili: x / Urobili: x   Blood: x / Protein: x / Nitrite: x   Leuk Esterase: x / RBC: x / WBC x   Sq Epi: x / Non Sq Epi: x / Bacteria: x      CULTURES:  Rapid RVP Result: NotDetec (07-07 @ 16:32)  Culture Results:   No growth at 24 hours (07-07 @ 16:32)  Culture Results:   No growth at 24 hours (07-07 @ 16:19)  Culture Results:   No growth (07-07 @ 16:19)        RADIOLOGY: ***      INVASIVE LINES:  INDWELLING AGUIRRE:  VTE PROPHYLAXIS:  CODE STATUS:    [id number] (40 +)  54190 (55+)  11347 (75+)    27522 (25+)  14792 (35+)  65888 (50+)    Time spent on this patient encounter, which includes documenting this note in the electronic medical record, was * minutes including assessing the presenting problems with associated risks, reviewing the medical record to prepare for the encounter, and meeting face to face with the patient to obtain additional history. I have also performed an appropriate physical exam, made interventions listed and ordered and interpreted appropriate diagnostic studies as documented. To improve communication and patient safety, I have coordinated care with the multidisciplinary team including the bedside nurse, appropriate attending of record  and consultants as needed    CRITICAL CARE TIME SPENT: *** minutes spent performing frequent bedside reassessments and augmenting plan of care to address problems of acute critical illness that pose high probability of life threatening deterioration and/or end organ damage/dysfunction and discussing goals of care, non-inclusive of time spent on procedures performed. CC: JD    [facility] HOSPITAL COURSE: 28m with no known pmhx came to the ED for further eval of intractable fevers x2 weeks despite taking APAP. Patient was reportedly evaluated last week in the ED. Workup including blood work were reportedly negative. The patient was then evaluated by his PCP on Wednesday where the patient had intractable vomiting. The patient was referred to [facility] at that time where the patient reportedly had a negative infectious workup there as well. A CT scan revealed splenomegaly. The patient additionally c/o a 2 week history of headaches. He denies any associated chest pain, palpitations, shortness of breath, abdominal pain, nausea, or recent sick contacts. Vital signs in triage => /76, HR 83/min, RR 18/min, TMax 100.9'F, SpO2 99% on room air. Labs => lymph% 51, AST/ALT 78/253. CXR => No radiographic evidence of active chest disease. In the ED the patient was given Acetaminophen 650mg PO x 1, and NS x 1L.   (07 Jul 2023 22:07)    Events last 24 hours: This morning, consulted by pulmonologist for bronch eval given reported 5mm polypoid lesion noted on upper trachea. Pt seen at bedside, states that he feels weak and overall unwell, otherwise without acute complaints.     ROS:   CONSTITUTIONAL: (-) fever, (-) chills  RESPIRATORY: (-) SOB, (-) cough  CV: (-) chest pain, (-) palpitations  GI: (-) abdominal pain, (-) nausea, (-) vomiting, (-) diarrhea, (-) constipation   NEURO: (-) headache, (+) weakness, (-) visual changes    PAST MEDICAL & SURGICAL HISTORY:  No pertinent past medical history  No significant past surgical history    Medications:  doxycycline monohydrate Capsule 100 milliGRAM(s) Oral every 12 hours    acetaminophen     Tablet .. 650 milliGRAM(s) Oral every 6 hours PRN  melatonin 3 milliGRAM(s) Oral at bedtime PRN  ondansetron Injectable 4 milliGRAM(s) IV Push every 8 hours PRN        aluminum hydroxide/magnesium hydroxide/simethicone Suspension 30 milliLiter(s) Oral every 4 hours PRN        dextrose 5% + sodium chloride 0.9%. 1000 milliLiter(s) IV Continuous <Continuous>                ICU Vital Signs Last 24 Hrs  T(C): 38.6 (09 Jul 2023 17:57), Max: 38.6 (09 Jul 2023 17:57)  T(F): 101.4 (09 Jul 2023 17:57), Max: 101.4 (09 Jul 2023 17:57)  HR: 84 (09 Jul 2023 17:57) (74 - 94)  BP: 118/71 (09 Jul 2023 17:57) (103/69 - 125/79)  BP(mean): --  ABP: --  ABP(mean): --  RR: 18 (09 Jul 2023 17:57) (18 - 18)  SpO2: 99% (09 Jul 2023 17:57) (94% - 100%)    O2 Parameters below as of 09 Jul 2023 17:57  Patient On (Oxygen Delivery Method): room air    Physical Examination:    General: sitting in chair, appears weak, tired   HEENT: Pupils equal, reactive to light.  Symmetric.  PULM: Clear to auscultation bilaterally, unlabored respirations on room air   CVS: Regular rate and rhythm, no murmurs, rubs, or gallops  ABD: Soft, nondistended, nontender, normoactive bowel sounds, no masses  EXT: No edema, nontender  SKIN: Warm and well perfused  NEURO: interactive, nonfocal     I&O's Detail    08 Jul 2023 07:01  -  09 Jul 2023 07:00  --------------------------------------------------------  IN:    dextrose 5% + sodium chloride 0.9%: 2500 mL    Lactated Ringers: 150 mL  Total IN: 2650 mL    OUT:  Total OUT: 0 mL    Total NET: 2650 mL          LABS:                        12.2   8.75  )-----------( 200      ( 09 Jul 2023 08:38 )             35.9     07-09    139  |  109<H>  |  9   ----------------------------<  99  4.2   |  26  |  0.89    Ca    8.3<L>      09 Jul 2023 07:43    TPro  5.9<L>  /  Alb  2.8<L>  /  TBili  0.6  /  DBili  x   /  AST  91<H>  /  ALT  240<H>  /  AlkPhos  97  07-09          CAPILLARY BLOOD GLUCOSE          Urinalysis Basic - ( 09 Jul 2023 07:43 )    Color: x / Appearance: x / SG: x / pH: x  Gluc: 99 mg/dL / Ketone: x  / Bili: x / Urobili: x   Blood: x / Protein: x / Nitrite: x   Leuk Esterase: x / RBC: x / WBC x   Sq Epi: x / Non Sq Epi: x / Bacteria: x      CULTURES:  Rapid RVP Result: NotDetec (07-07 @ 16:32)  Culture Results:   No growth at 24 hours (07-07 @ 16:32)  Culture Results:   No growth at 24 hours (07-07 @ 16:19)  Culture Results:   No growth (07-07 @ 16:19)        RADIOLOGY: < from: Xray Chest 2 Views PA/Lat (07.07.23 @ 16:00) >    ACC: [id number] EXAM:  XR CHEST PA LAT 2V   ORDERED BY: FROILAN YUSUF     PROCEDURE DATE:  07/07/2023          INTERPRETATION:  PA and lateral chest radiographs    COMPARISON:  NONE.    CLINICAL INFORMATION: Sepsis.    FINDINGS:    PULMONARY: The airway is midline.  There are no airspace consolidations or radiographic evidence of   pulmonary nodules..  No pleural effusion or pneumothorax.    HEART/VASCULAR: The heart size and mediastinum configuration are within   the limits of normal.    BONES: The visualized osseous thorax is intact.    IMPRESSION:    No radiographic evidence of active chest disease..    --- End of Report ---            MILO DELUNA MD; Attending Radiologist  This document has been electronically signed. Jul 7 2023  4:16PM    < end of copied text >      Time spent on this patient encounter, which includes documenting this note in the electronic medical record, was 41 minutes including assessing the presenting problems with associated risks, reviewing the medical record to prepare for the encounter, and meeting face to face with the patient to obtain additional history. I have also performed an appropriate physical exam, made interventions listed and ordered and interpreted appropriate diagnostic studies as documented. To improve communication and patient safety, I have coordinated care with the multidisciplinary team including the bedside nurse, appropriate attending of record  and consultants as needed

## 2023-07-09 NOTE — PROGRESS NOTE ADULT - ASSESSMENT
28 year old male no known PMHx presents to the [facility] for further evaluation and management of a 2 week history of intractable fevers (TMax 102.8'F) despite taking Acetaminophen at home. The patient was reportedly evaluated last week in the [facility]. Workup including blood work were reportedly negative. The patient was then evaluated by his PCP on Wednesday where the patient had intractable vomiting. The patient was referred to [facility] at that time where the patient reportedly had a negative infectious workup there as well. A CT scan revealed splenomegaly. The patient additionally c/o a 2 week history of headaches. He denies any associated chest pain, palpitations, shortness of breath, abdominal pain, nausea, or recent sick contacts. Vital signs in triage => /76, HR 83/min, RR 18/min, TMax 100.9'F, SpO2 99% on room air. Labs => lymph% 51, AST/ALT 78/253. CXR => No radiographic evidence of active chest disease. In the ED the patient was given Acetaminophen 650mg PO x 1, and NS x 1L.    #Fever of Unknown Origin  ~admit to Medicine  ~DDx includes infections, malignancies, and systemic rheumatic diseases  ~f/u Blood C+S  ~f/u Fungal C+S  ~f/u Urinalysis  ~f/u Urine C+S  ~f/u Lyme Vise IgG/IgM AB Reflex  ~f/u Babesia microti PCR  ~f/u Enrlichea/Anaplasma PCR  ~f/u Viral Hepatitis profile  ~f/u ESR/CRP/MARISOL  ~f/u rheumatoid factor  ~heterophile antibody test for mononucleosis  ~f/u tuberculin skin test or interferon-gamma release assay  ~f/u serum protein electrophoresis  ~f/u HIV AB testing  ~cont. anti-pyretics prn  ~cont. 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 SCDs for now     poc discussed with ptkarmen, team, Dr. Pickens

## 2023-07-09 NOTE — PROGRESS NOTE ADULT - SUBJECTIVE AND OBJECTIVE BOX
Date of service: 07-09-23 10:48    pt seen and examined  feels better  has fevers o/n and in evening  temp curve slightly better      ROS: denies dizziness, no HA, no SOB or cough, no abdominal pain, no diarrhea or constipation; no dysuria, no urinary frequency, no legs pain, no rashes    MEDICATIONS  (STANDING):  dextrose 5% + sodium chloride 0.9%. 1000 milliLiter(s) (125 mL/Hr) IV Continuous <Continuous>  doxycycline monohydrate Capsule 100 milliGRAM(s) Oral every 12 hours    MEDICATIONS  (PRN):  acetaminophen     Tablet .. 650 milliGRAM(s) Oral every 6 hours PRN Temp greater or equal to 38C (100.4F), Mild Pain (1 - 3)  aluminum hydroxide/magnesium hydroxide/simethicone Suspension 30 milliLiter(s) Oral every 4 hours PRN Dyspepsia  melatonin 3 milliGRAM(s) Oral at bedtime PRN Insomnia  ondansetron Injectable 4 milliGRAM(s) IV Push every 8 hours PRN Nausea and/or Vomiting      Vital Signs Last 24 Hrs  T(C): 38.6 (09 Jul 2023 17:57), Max: 38.6 (09 Jul 2023 17:57)  T(F): 101.4 (09 Jul 2023 17:57), Max: 101.4 (09 Jul 2023 17:57)  HR: 84 (09 Jul 2023 17:57) (74 - 94)  BP: 118/71 (09 Jul 2023 17:57) (103/69 - 125/79)  BP(mean): --  RR: 18 (09 Jul 2023 17:57) (18 - 18)  SpO2: 99% (09 Jul 2023 17:57) (94% - 100%)    Parameters below as of 09 Jul 2023 17:57  Patient On (Oxygen Delivery Method): room air      PE:  Constitutional: NAD   HEENT: NC/AT, EOMI, PERRLA, conjunctivae clear; ears and nose atraumatic; pharynx benign  Neck: supple; thyroid not palpable  Back: no tenderness  Respiratory: respiratory effort normal; clear to auscultation  Cardiovascular: S1S2 regular, no murmurs  Abdomen: soft, not tender, not distended, positive BS; liver and spleen WNL  Genitourinary: no suprapubic tenderness  Lymphatic: no LN palpable  Musculoskeletal: no muscle tenderness, no joint swelling or tenderness  Extremities: no pedal edema  Neurological/ Psychiatric: AxOx3, Judgement and insight normal;  moving all extremities  Skin: no rashes; no palpable lesions    Labs: all available labs reviewed                                   12.2   8.75  )-----------( 200      ( 09 Jul 2023 08:38 )             35.9     07-09    139  |  109<H>  |  9   ----------------------------<  99  4.2   |  26  |  0.89    Ca    8.3<L>      09 Jul 2023 07:43    TPro  5.9<L>  /  Alb  2.8<L>  /  TBili  0.6  /  DBili  x   /  AST  91<H>  /  ALT  240<H>  /  AlkPhos  97  07-09        LIVER FUNCTIONS - ( 08 Jul 2023 08:23 )  Alb: 3.0 g/dL / Pro: 6.3 gm/dL / ALK PHOS: 105 U/L / ALT: 232 U/L / AST: 84 U/L / GGT: x           Urinalysis Basic - ( 08 Jul 2023 08:23 )    Color: x / Appearance: x / SG: x / pH: x  Gluc: 97 mg/dL / Ketone: x  / Bili: x / Urobili: x   Blood: x / Protein: x / Nitrite: x   Leuk Esterase: x / RBC: x / WBC x   Sq Epi: x / Non Sq Epi: x / Bacteria: x    Culture - Blood (07.07.23 @ 16:32)   Specimen Source: .Blood None  Culture Results:   No growth at 48 Hours  Culture - Urine (07.07.23 @ 16:19)   Specimen Source: Clean Catch None  Culture Results:   No growth      Radiology: all available radiological tests reviewed  < from: Xray Chest 2 Views PA/Lat (07.07.23 @ 16:00) >    ACC: [id number] EXAM:  XR CHEST PA LAT 2V   ORDERED BY: FROILAN YUSUF     PROCEDURE DATE:  07/07/2023          INTERPRETATION:  PA and lateral chest radiographs    COMPARISON:  NONE.    CLINICAL INFORMATION: Sepsis.    FINDINGS:    PULMONARY: The airway is midline.  There are no airspace consolidations or radiographic evidence of   pulmonary nodules..  No pleural effusion or pneumothorax.    HEART/VASCULAR: The heart size and mediastinum configuration are within   the limits of normal.    BONES: The visualized osseous thorax is intact.    IMPRESSION:    No radiographic evidence of active chest disease..    --- End of Report ---      < end of copied text >    Advanced directives addressed: full resuscitation

## 2023-07-09 NOTE — PROGRESS NOTE ADULT - SUBJECTIVE AND OBJECTIVE BOX
HPI: 28 year old male no known PMHx presents to the [facility] for further evaluation and management of a 2 week history of intractable fevers (TMax 102.8'F) despite taking Acetaminophen at home. The patient was reportedly evaluated last week in the [facility]. Workup including blood work were reportedly negative. The patient was then evaluated by his PCP on Wednesday where the patient had intractable vomiting. The patient was referred to [facility] at that time where the patient reportedly had a negative infectious workup there as well. A CT scan revealed splenomegaly. The patient additionally c/o a 2 week history of headaches. He denies any associated chest pain, palpitations, shortness of breath, abdominal pain, nausea, or recent sick contacts. Vital signs in triage => /76, HR 83/min, RR 18/min, TMax 100.9'F, SpO2 99% on room air. Labs => lymph% 51, AST/ALT 78/253. CXR => No radiographic evidence of active chest disease. In the ED the patient was given Acetaminophen 650mg PO x 1, and NS x 1L.    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    7/9: pt feels weak  temp of 101.2 last night again  hematology + rheumatology consults    PHYSICAL EXAM:    Vital Signs Last 24 Hrs  T(C): 37.1 (09 Jul 2023 08:23), Max: 38.4 (08 Jul 2023 19:33)  T(F): 98.8 (09 Jul 2023 08:23), Max: 101.2 (08 Jul 2023 19:33)  HR: 77 (09 Jul 2023 08:23) (74 - 94)  BP: 109/60 (09 Jul 2023 08:23) (103/69 - 115/74)  BP(mean): --  RR: 18 (09 Jul 2023 08:23) (18 - 18)  SpO2: 94% (09 Jul 2023 08:23) (94% - 98%)    Parameters below as of 09 Jul 2023 08:23  Patient On (Oxygen Delivery Method): room air        Constitutional: Weak  appearing  HEENT: Atraumatic, JOSE,   Respiratory: Breath Sounds normal, no rhonchi/wheeze  Cardiovascular: N S1S2;   Gastrointestinal: Abdomen soft, non tender, Bowel Sounds present  Extremities: No edema, peripheral pulses present  Neurological: AAO x 3, no gross focal motor deficits  Skin: Non cellulitic, no rash, ulcers  Lymph Nodes: No lymphadenopathy noted  Back: No CVA tenderness   Musculoskeletal: non tender  Breasts: Deferred  Genitourinary: deferred  Rectal: Deferred    All Labs/EKG/Radiology/Meds reviewed    Lab Results:  CBC  CBC Full  -  ( 09 Jul 2023 08:38 )  WBC Count : 8.75 K/uL  RBC Count : 4.09 M/uL  Hemoglobin : 12.2 g/dL  Hematocrit : 35.9 %  Platelet Count - Automated : 200 K/uL  Mean Cell Volume : 87.8 fl  Mean Cell Hemoglobin : 29.8 pg  Mean Cell Hemoglobin Concentration : 34.0 gm/dL  Auto Neutrophil # : x  Auto Lymphocyte # : x  Auto Monocyte # : x  Auto Eosinophil # : x  Auto Basophil # : x  Auto Neutrophil % : x  Auto Lymphocyte % : x  Auto Monocyte % : x  Auto Eosinophil % : x  Auto Basophil % : x    .		Differential:	[] Automated		[] Manual  Chemistry  07-09    139  |  109<H>  |  9   ----------------------------<  99  4.2   |  26  |  0.89    Ca    8.3<L>      09 Jul 2023 07:43    TPro  5.9<L>  /  Alb  2.8<L>  /  TBili  0.6  /  DBili  x   /  AST  91<H>  /  ALT  240<H>  /  AlkPhos  97  07-09    LIVER FUNCTIONS - ( 09 Jul 2023 07:43 )  Alb: 2.8 g/dL / Pro: 5.9 gm/dL / ALK PHOS: 97 U/L / ALT: 240 U/L / AST: 91 U/L / GGT: x           PT/INR - ( 07 Jul 2023 16:19 )   PT: 13.7 sec;   INR: 1.18 ratio         PTT - ( 07 Jul 2023 16:19 )  PTT:34.0 sec  Urinalysis Basic - ( 09 Jul 2023 07:43 )    Color: x / Appearance: x / SG: x / pH: x  Gluc: 99 mg/dL / Ketone: x  / Bili: x / Urobili: x   Blood: x / Protein: x / Nitrite: x   Leuk Esterase: x / RBC: x / WBC x   Sq Epi: x / Non Sq Epi: x / Bacteria: x        07-08-23 @ 07:01  -  07-09-23 @ 07:00  --------------------------------------------------------  IN: 2650 mL / OUT: 0 mL / NET: 2650 mL      MICROBIOLOGY/CULTURES:  Culture Results:   No growth at 24 hours (07-07 @ 16:32)  Culture Results:   No growth at 24 hours (07-07 @ 16:19)  Culture Results:   No growth (07-07 @ 16:19)            MEDICATIONS  (STANDING):  dextrose 5% + sodium chloride 0.9%. 1000 milliLiter(s) (125 mL/Hr) IV Continuous <Continuous>  doxycycline monohydrate Capsule 100 milliGRAM(s) Oral every 12 hours    MEDICATIONS  (PRN):  acetaminophen     Tablet .. 650 milliGRAM(s) Oral every 6 hours PRN Temp greater or equal to 38C (100.4F), Mild Pain (1 - 3)  aluminum hydroxide/magnesium hydroxide/simethicone Suspension 30 milliLiter(s) Oral every 4 hours PRN Dyspepsia  melatonin 3 milliGRAM(s) Oral at bedtime PRN Insomnia  ondansetron Injectable 4 milliGRAM(s) IV Push every 8 hours PRN Nausea and/or Vomiting

## 2023-07-09 NOTE — CONSULT NOTE ADULT - ASSESSMENT
28 year old male no known PMHx presents to the [facility] for further evaluation and management of a 2 week history of intractable fevers (TMax 102.8'F) despite taking Acetaminophen at home. The patient was reportedly evaluated last week in the [facility]. Workup including blood work were reportedly negative. The patient was then evaluated by his PCP on Wednesday where the patient had intractable vomiting. The patient was referred to [facility] at that time where the patient reportedly had a negative infectious workup there as well. A CT scan revealed splenomegaly. The patient additionally c/o a 2 week history of headaches. He denies any associated chest pain, palpitations, shortness of breath, abdominal pain, nausea, or recent sick contacts. Vital signs in triage => /76, HR 83/min, RR 18/min, TMax 100.9'F, SpO2 99% on room air. Labs => lymph% 51, AST/ALT 78/253. CXR => No radiographic evidence of active chest disease. In the ED the patient was given Acetaminophen 650mg PO x 1, and NS x 1L.   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 called    Assessment / plan:  Fever 101-102 x 2-3 weeks  etiology remains obscured  r/o tick born illness  no evidence of sinusitis or pneumonia  mild cough  Tracheal lesion seen on ct chest  r/o pulm HTN based on PA possible enlargement    ECHO suggested  thoracic surgery eval needed  films are done at [facility] and can be downloaded for review  fu cultures and ID recommendations  IVF hydration for ongoing fever

## 2023-07-09 NOTE — PROGRESS NOTE ADULT - ASSESSMENT
28 year old male no known PMHx presents to the [facility] for further evaluation and management of a 2 week history of intractable fevers (TMax 102.8'F) despite taking Acetaminophen at home. The patient was reportedly evaluated last week in the [facility]. Workup including blood work were reportedly negative. The patient was then evaluated by his PCP on Wednesday where the patient had intractable vomiting. The patient was referred to [facility] at that time where the patient reportedly had a negative infectious workup there as well. A CT scan revealed splenomegaly. The patient additionally c/o a 2 week history of headaches. He denies any associated chest pain, palpitations, shortness of breath, abdominal pain, nausea, or recent sick contacts. Vital signs in triage => /76, HR 83/min, RR 18/min, TMax 100.9'F, SpO2 99% on room air. Labs => lymph% 51, AST/ALT 78/253. CXR => No radiographic evidence of active chest disease. In the ED the patient was given Acetaminophen 650mg PO x 1, and NS x 1L.    1. Fever. Malaise. Splenomegaly. Transamnitis ? Tick borne disease  - babesia pcr (-)  lyme screen (-), ehrlichia, anaplasma pcr  - on doxycycline #2 continue this pending ehrlichia, anaplasma testing   - monitor temps  - f/u cultures - no growth  - tolerating abx well so far; no side effects noted  - reason for abx use and side effects reviewed with patient  - rheum w/u in progress   - supportive care  - fu cbc    2. other issues - care per medicine

## 2023-07-09 NOTE — CONSULT NOTE ADULT - ASSESSMENT
28M with no known pmhx now with:     fever of unknown origin   ?polypoid lesion     - pt has records on his phone, he is going to submit record request with [facility] to release results   - CT scan of chest reported to have 5mm polypoid lesion of upper trachea   - further plan pending obtaining official imaging/reports for ?bronch   - no indication for urgent intervention at this time, thoracic surgery to f/u in AM   - remainder of care per primary team - abx, f/u   - pulm, ID also following    Case discussed with Dr. rincon  28M with no known pmhx now with:     fever of unknown origin   ?polypoid lesion     - pt has records on his phone, he is going to submit formal record request with [facility] to release results   - CT scan of chest reported to have 5mm polypoid lesion of upper trachea   - further plan pending obtaining official imaging/reports for ?bronch   - no indication for urgent intervention at this time, thoracic surgery to f/u in AM   - remainder of care per primary team - abx, f/u   - pulm, ID also following    Case discussed with Dr. Biswas

## 2023-07-10 ENCOUNTER — TRANSCRIPTION ENCOUNTER (OUTPATIENT)
Age: 28
End: 2023-07-10

## 2023-07-10 PROBLEM — Z00.00 ENCOUNTER FOR PREVENTIVE HEALTH EXAMINATION: Status: ACTIVE | Noted: 2023-07-10

## 2023-07-10 LAB
% ALBUMIN: 56.7 % — SIGNIFICANT CHANGE UP
% ALPHA 1: 6.7 % — SIGNIFICANT CHANGE UP
% ALPHA 2: 7.9 % — SIGNIFICANT CHANGE UP
% BETA: 14.9 % — SIGNIFICANT CHANGE UP
% GAMMA: 13.8 % — SIGNIFICANT CHANGE UP
% M SPIKE: SIGNIFICANT CHANGE UP
24R-OH-CALCIDIOL SERPL-MCNC: 22.4 NG/ML — LOW (ref 30–80)
ALBUMIN SERPL ELPH-MCNC: 2.8 G/DL — LOW (ref 3.3–5)
ALBUMIN SERPL ELPH-MCNC: 3.3 G/DL — LOW (ref 3.6–5.5)
ALBUMIN/GLOB SERPL ELPH: 1.3 RATIO — SIGNIFICANT CHANGE UP
ALP SERPL-CCNC: 105 U/L — SIGNIFICANT CHANGE UP (ref 40–120)
ALPHA1 GLOB SERPL ELPH-MCNC: 0.4 G/DL — SIGNIFICANT CHANGE UP (ref 0.1–0.4)
ALPHA2 GLOB SERPL ELPH-MCNC: 0.5 G/DL — SIGNIFICANT CHANGE UP (ref 0.5–1)
ALT FLD-CCNC: 298 U/L — HIGH (ref 12–78)
ANA TITR SER: NEGATIVE — SIGNIFICANT CHANGE UP
ANION GAP SERPL CALC-SCNC: 5 MMOL/L — SIGNIFICANT CHANGE UP (ref 5–17)
AST SERPL-CCNC: 109 U/L — HIGH (ref 15–37)
B-GLOBULIN SERPL ELPH-MCNC: 0.9 G/DL — SIGNIFICANT CHANGE UP (ref 0.5–1)
BILIRUB SERPL-MCNC: 0.6 MG/DL — SIGNIFICANT CHANGE UP (ref 0.2–1.2)
BUN SERPL-MCNC: 8 MG/DL — SIGNIFICANT CHANGE UP (ref 7–23)
C3 SERPL-MCNC: 119 MG/DL — SIGNIFICANT CHANGE UP (ref 81–157)
C4 SERPL-MCNC: 42 MG/DL — HIGH (ref 13–39)
CALCIUM SERPL-MCNC: 8.3 MG/DL — LOW (ref 8.5–10.1)
CHLORIDE SERPL-SCNC: 110 MMOL/L — HIGH (ref 96–108)
CO2 SERPL-SCNC: 27 MMOL/L — SIGNIFICANT CHANGE UP (ref 22–31)
CREAT SERPL-MCNC: 0.87 MG/DL — SIGNIFICANT CHANGE UP (ref 0.5–1.3)
EGFR: 121 ML/MIN/1.73M2 — SIGNIFICANT CHANGE UP
ERYTHROCYTE [SEDIMENTATION RATE] IN BLOOD: 14 MM/HR — SIGNIFICANT CHANGE UP (ref 0–15)
FERRITIN SERPL-MCNC: 978 NG/ML — HIGH (ref 30–400)
FIBRINOGEN PPP-MCNC: 227 MG/DL — SIGNIFICANT CHANGE UP (ref 200–435)
GAMMA GLOBULIN: 0.8 G/DL — SIGNIFICANT CHANGE UP (ref 0.6–1.6)
GLUCOSE SERPL-MCNC: 91 MG/DL — SIGNIFICANT CHANGE UP (ref 70–99)
HCT VFR BLD CALC: 36.3 % — LOW (ref 39–50)
HGB BLD-MCNC: 12.6 G/DL — LOW (ref 13–17)
INTERPRETATION SERPL IFE-IMP: SIGNIFICANT CHANGE UP
M-SPIKE: SIGNIFICANT CHANGE UP (ref 0–0)
MCHC RBC-ENTMCNC: 29.9 PG — SIGNIFICANT CHANGE UP (ref 27–34)
MCHC RBC-ENTMCNC: 34.7 GM/DL — SIGNIFICANT CHANGE UP (ref 32–36)
MCV RBC AUTO: 86.2 FL — SIGNIFICANT CHANGE UP (ref 80–100)
PLATELET # BLD AUTO: 198 K/UL — SIGNIFICANT CHANGE UP (ref 150–400)
POTASSIUM SERPL-MCNC: 3.8 MMOL/L — SIGNIFICANT CHANGE UP (ref 3.5–5.3)
POTASSIUM SERPL-SCNC: 3.8 MMOL/L — SIGNIFICANT CHANGE UP (ref 3.5–5.3)
PROT PATTERN SERPL ELPH-IMP: SIGNIFICANT CHANGE UP
PROT SERPL-MCNC: 6.1 GM/DL — SIGNIFICANT CHANGE UP (ref 6–8.3)
RBC # BLD: 4.21 M/UL — SIGNIFICANT CHANGE UP (ref 4.2–5.8)
RBC # FLD: 13 % — SIGNIFICANT CHANGE UP (ref 10.3–14.5)
SODIUM SERPL-SCNC: 142 MMOL/L — SIGNIFICANT CHANGE UP (ref 135–145)
TRIGL SERPL-MCNC: 268 MG/DL — HIGH
WBC # BLD: 8.61 K/UL — SIGNIFICANT CHANGE UP (ref 3.8–10.5)
WBC # FLD AUTO: 8.61 K/UL — SIGNIFICANT CHANGE UP (ref 3.8–10.5)

## 2023-07-10 PROCEDURE — 88189 FLOWCYTOMETRY/READ 16 & >: CPT

## 2023-07-10 PROCEDURE — 99233 SBSQ HOSP IP/OBS HIGH 50: CPT

## 2023-07-10 PROCEDURE — 99222 1ST HOSP IP/OBS MODERATE 55: CPT

## 2023-07-10 PROCEDURE — 71250 CT THORAX DX C-: CPT | Mod: 26

## 2023-07-10 RX ADMIN — Medication 650 MILLIGRAM(S): at 01:56

## 2023-07-10 RX ADMIN — SODIUM CHLORIDE 125 MILLILITER(S): 9 INJECTION, SOLUTION INTRAVENOUS at 08:47

## 2023-07-10 RX ADMIN — Medication 100 MILLIGRAM(S): at 09:16

## 2023-07-10 RX ADMIN — SODIUM CHLORIDE 125 MILLILITER(S): 9 INJECTION, SOLUTION INTRAVENOUS at 01:54

## 2023-07-10 RX ADMIN — Medication 100 MILLIGRAM(S): at 22:26

## 2023-07-10 RX ADMIN — SODIUM CHLORIDE 125 MILLILITER(S): 9 INJECTION, SOLUTION INTRAVENOUS at 18:24

## 2023-07-10 RX ADMIN — Medication 650 MILLIGRAM(S): at 04:00

## 2023-07-10 RX ADMIN — Medication 650 MILLIGRAM(S): at 15:29

## 2023-07-10 NOTE — PROGRESS NOTE ADULT - SUBJECTIVE AND OBJECTIVE BOX
Subjective: Pt in bed NAD. CT scan and is neg  No need for  any thoracic intervention     T(C): 38.5 (07-10-23 @ 15:09), Max: 38.6 (07-09-23 @ 17:57)  HR: 86 (07-10-23 @ 15:09) (70 - 86)  BP: 118/71 (07-10-23 @ 15:09) (103/59 - 124/67)  ABP: --  ABP(mean): --  RR: 18 (07-10-23 @ 15:09) (18 - 18)  SpO2: 99% (07-10-23 @ 15:09) (98% - 99%) RA   Wt(kg): --  CVP(mm Hg): --  CO: --  CI: --  PA: --                                              Tele: SR             07-10    142  |  110<H>  |  8   ----------------------------<  91  3.8   |  27  |  0.87    Ca    8.3<L>      10 Jul 2023 08:57    TPro  6.1  /  Alb  2.8<L>  /  TBili  0.6  /  DBili  x   /  AST  109<H>  /  ALT  298<H>  /  AlkPhos  105  07-10                               12.6   8.61  )-----------( 198      ( 10 Jul 2023 08:57 )             36.3           < from: CT Chest No Cont (07.10.23 @ 10:50) >    IMPRESSION:.    No pneumonia.    Trace left pleural effusion.    Splenomegaly.    Bilateral gynecomastia.    < end of copied text >        CAPILLARY BLOOD GLUCOSE                         Exam   Neuro:  Alert Awake NAD   Pulm: decreased at bases b/l   CV: RRR S1 S2   Abd: soft   Extremities: warm         Assessment:  28yMale    with PAST MEDICAL & SURGICAL HISTORY:  No pertinent past medical history      No significant past surgical history            Plan:

## 2023-07-10 NOTE — CONSULT NOTE ADULT - CONSULT REASON
mucoid lesion
fever / tracheal lesion
fever splenomegaly
fever   splenomegaly  malaise
fever of unknown origin

## 2023-07-10 NOTE — PROGRESS NOTE ADULT - ASSESSMENT
28 year old male no known PMHx presents to the [facility] for further evaluation and management of a 2 week history of intractable fevers (TMax 102.8'F) despite taking Acetaminophen at home. The patient was reportedly evaluated last week in the [facility]. Workup including blood work were reportedly negative. The patient was then evaluated by his PCP on Wednesday where the patient had intractable vomiting. The patient was referred to [facility] at that time where the patient reportedly had a negative infectious workup there as well. A CT scan revealed splenomegaly. The patient additionally c/o a 2 week history of headaches. He denies any associated chest pain, palpitations, shortness of breath, abdominal pain, nausea, or recent sick contacts. Vital signs in triage => /76, HR 83/min, RR 18/min, TMax 100.9'F, SpO2 99% on room air. Labs => lymph% 51, AST/ALT 78/253. CXR => No radiographic evidence of active chest disease. In the ED the patient was given Acetaminophen 650mg PO x 1, and NS x 1L.    #Fever of Unknown Origin +massive splenomegaly:    ~admit to Medicine  ~DDx includes infections, malignancies, and systemic rheumatic diseases  ~f/u Blood C+S neg  ~f/u Fungal C+S  ~f/u Urinalysis neg  ~f/u Urine C+S, neg  ~f/u Lyme Vise IgG/IgM AB Reflex, neg  ~f/u Babesia microti PCR, neg  ~f/u Enrlichea/Anaplasma PCR awaited  ~f/u Viral Hepatitis profile neg  ~f/u ESR/CRP/MARISOL  ~f/u rheumatoid factor, normal  ~heterophile antibody test for mononucleosis  ~f/u tuberculin skin test or interferon-gamma release assay  ~f/u serum protein electrophoresis  ~f/u HIV AB testing neg  ~cont. anti-pyretics prn  ~cont. 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 SCDs for now     poc discussed with pt, team, Dr. Pickens , Dr. Guzman, CT Sx NP.

## 2023-07-10 NOTE — CONSULT NOTE ADULT - SUBJECTIVE AND OBJECTIVE BOX
FELIBERTO DEBORAH  [id number]    HISTORY OF PRESENT ILLNESS:    PAST MEDICAL & SURGICAL HISTORY:  No pertinent past medical history      No significant past surgical history          Review of Systems:  Gen:  No fevers/chills, weight loss  HEENT: No blurry vision, no difficulty swallowing  CVS: No chest pain/palpitations  Resp: No SOB/wheezing  GI: No N/V/C/D/abdominal pain  MSK:  Skin: No new rashes  Neuro: No headaches    MEDICATIONS  (STANDING):  dextrose 5% + sodium chloride 0.9%. 1000 milliLiter(s) (125 mL/Hr) IV Continuous <Continuous>  doxycycline monohydrate Capsule 100 milliGRAM(s) Oral every 12 hours    MEDICATIONS  (PRN):  acetaminophen     Tablet .. 650 milliGRAM(s) Oral every 6 hours PRN Temp greater or equal to 38C (100.4F), Mild Pain (1 - 3)  aluminum hydroxide/magnesium hydroxide/simethicone Suspension 30 milliLiter(s) Oral every 4 hours PRN Dyspepsia  melatonin 3 milliGRAM(s) Oral at bedtime PRN Insomnia  ondansetron Injectable 4 milliGRAM(s) IV Push every 8 hours PRN Nausea and/or Vomiting      Allergies    No Known Allergies    Intolerances        PERTINENT MEDICATION HISTORY:    SOCIAL HISTORY:  OCCUPATION:  TRAVEL HISTORY:    FAMILY HISTORY:  No pertinent family history in first degree relatives        Vital Signs Last 24 Hrs  T(C): 36.9 (10 Jul 2023 08:16), Max: 38.6 (09 Jul 2023 17:57)  T(F): 98.4 (10 Jul 2023 08:16), Max: 101.4 (09 Jul 2023 17:57)  HR: 70 (10 Jul 2023 08:16) (70 - 94)  BP: 124/67 (10 Jul 2023 08:16) (103/59 - 125/79)  BP(mean): --  RR: 18 (10 Jul 2023 08:16) (18 - 18)  SpO2: 99% (10 Jul 2023 08:16) (98% - 100%)    Parameters below as of 10 Jul 2023 08:16  Patient On (Oxygen Delivery Method): room air        Physical Exam:  General: No apparent distress  HEENT: EOMI, MMM  CVS: +S1/S2, RRR, no murmurs/rubs/gallops  Resp: CTA b/l. No crackles/wheezing  GI: Soft, NT/ND +BS  MSK:  Shoulders: wnl  Elbows: wnl  Wrists: wnl  MCPs: wnl  PIPs: wnl  DIPs: wnl   Hips: wnl  Knees: wnl   Ankle: wnl  Neuro: AAOx3  Skin: no visible rashes    LABS:                        12.6   8.61  )-----------( 198      ( 10 Jul 2023 08:57 )             36.3     07-10    142  |  110<H>  |  8   ----------------------------<  91  3.8   |  27  |  0.87    Ca    8.3<L>      10 Jul 2023 08:57    TPro  6.1  /  Alb  2.8<L>  /  TBili  0.6  /  DBili  x   /  AST  109<H>  /  ALT  298<H>  /  AlkPhos  105  07-10      Urinalysis Basic - ( 10 Jul 2023 08:57 )    Color: x / Appearance: x / SG: x / pH: x  Gluc: 91 mg/dL / Ketone: x  / Bili: x / Urobili: x   Blood: x / Protein: x / Nitrite: x   Leuk Esterase: x / RBC: x / WBC x   Sq Epi: x / Non Sq Epi: x / Bacteria: x        RADIOLOGY & ADDITIONAL STUDIES: FELIBERTO CABRERA  [id number]    HISTORY OF PRESENT ILLNESS: 28 year old male no known PMHx presents to the [facility] for further evaluation and management of a 2 week history of intractable fevers (TMax 102.8'F) despite taking Acetaminophen at home. The patient was reportedly evaluated last week in the [facility]. Workup including blood work were reportedly negative. The patient was then evaluated by his PCP on Wednesday where the patient had intractable vomiting. The patient was referred to [facility] at that time where the patient reportedly had a negative infectious workup there as well. A CT scan revealed splenomegaly. The patient additionally c/o a 2 week history of headaches. He denies any associated chest pain, palpitations, shortness of breath, abdominal pain, nausea, or recent sick contacts. Vital signs in triage => /76, HR 83/min, RR 18/min, TMax 100.9'F, SpO2 99% on room air. Labs => lymph% 51, AST/ALT 78/253. CXR => No radiographic evidence of active chest disease. In the ED the patient was given Acetaminophen 650mg PO x 1, and NS x 1L.    Rheumatology consulted to evaluate FUO. Patient denies personal or family history of autoimmune disease.  Aside from L. hip impingement from playing lacrosse, denies any other joint pain or joint swelling. Denies any rashes associated or not associated with fevers.  No oral or nasal ulcers currently but reports he did have a canker sore on his lip at onset of the fever- has gotten these in past.   No dry eyes or dry mouth. NO hair or weight loss, though patient does report +night sweats; fevers typically occur in middle of night; last fever was reportedly 101F at 1 am last night.   Denies known lymphadenopathy; denies painful subcutaneous nodules. Denies dyspnea though has a cough with intermittently productive clear sputum which also started at onset of fevers. CXR was clear.  Denies Raynauds, dysphagia or skin thickening/tightening. Denies hematuria, blood in stool, or diarrhea. Denies a history of IBD. Denies sore throat.       PAST MEDICAL & SURGICAL HISTORY:  No pertinent past medical history      No significant past surgical history          Review of Systems:  Gen:  +Fevers and night sweats, no weight loss  HEENT: No blurry vision, no difficulty swallowing  CVS: No chest pain/palpitations  Resp: No SOB/wheezing; +intermittently productive cough with slight clear sputum.   GI: No N/V/C/D/abdominal pain  MSK: No joint pain or swelling   Skin: No new rashes  Neuro: +headache at onset of fever, now improved with IV F hydration     MEDICATIONS  (STANDING):  dextrose 5% + sodium chloride 0.9%. 1000 milliLiter(s) (125 mL/Hr) IV Continuous <Continuous>  doxycycline monohydrate Capsule 100 milliGRAM(s) Oral every 12 hours    MEDICATIONS  (PRN):  acetaminophen     Tablet .. 650 milliGRAM(s) Oral every 6 hours PRN Temp greater or equal to 38C (100.4F), Mild Pain (1 - 3)  aluminum hydroxide/magnesium hydroxide/simethicone Suspension 30 milliLiter(s) Oral every 4 hours PRN Dyspepsia  melatonin 3 milliGRAM(s) Oral at bedtime PRN Insomnia  ondansetron Injectable 4 milliGRAM(s) IV Push every 8 hours PRN Nausea and/or Vomiting      Allergies    No Known Allergies    Intolerances        PERTINENT MEDICATION HISTORY:    SOCIAL HISTORY: ; no tobacco use; fiance is a nurse  OCCUPATION: Patient works as a  in [employer]  TRAVEL HISTORY: No recent travel within or outside of the country.     FAMILY HISTORY:  No pertinent family history in first degree relatives        Vital Signs Last 24 Hrs  T(C): 36.9 (10 Jul 2023 08:16), Max: 38.6 (09 Jul 2023 17:57)  T(F): 98.4 (10 Jul 2023 08:16), Max: 101.4 (09 Jul 2023 17:57)  HR: 70 (10 Jul 2023 08:16) (70 - 94)  BP: 124/67 (10 Jul 2023 08:16) (103/59 - 125/79)  BP(mean): --  RR: 18 (10 Jul 2023 08:16) (18 - 18)  SpO2: 99% (10 Jul 2023 08:16) (98% - 100%)    Parameters below as of 10 Jul 2023 08:16  Patient On (Oxygen Delivery Method): room air        Physical Exam:  General: No apparent distress  HEENT: EOMI, MMM  CVS: +S1/S2, RRR, no murmurs/rubs/gallops  Resp: CTA b/l. No crackles/wheezing  GI: Soft, NT/ND +BS  MSK:  Shoulders: wnl  Elbows: wnl  Wrists: wnl  MCPs: wnl  PIPs: wnl  DIPs: wnl   Hips: wnl  Knees: wnl   Ankle: wnl  Neuro: AAOx3  Skin: no visible rashes    LABS:                        12.6   8.61  )-----------( 198      ( 10 Jul 2023 08:57 )             36.3     07-10    142  |  110<H>  |  8   ----------------------------<  91  3.8   |  27  |  0.87    Ca    8.3<L>      10 Jul 2023 08:57    TPro  6.1  /  Alb  2.8<L>  /  TBili  0.6  /  DBili  x   /  AST  109<H>  /  ALT  298<H>  /  AlkPhos  105  07-10      Urinalysis Basic - ( 10 Jul 2023 08:57 )    Color: x / Appearance: x / SG: x / pH: x  Gluc: 91 mg/dL / Ketone: x  / Bili: x / Urobili: x   Blood: x / Protein: x / Nitrite: x   Leuk Esterase: x / RBC: x / WBC x   Sq Epi: x / Non Sq Epi: x / Bacteria: x        RADIOLOGY & ADDITIONAL STUDIES:

## 2023-07-10 NOTE — PROGRESS NOTE ADULT - SUBJECTIVE AND OBJECTIVE BOX
Date of service: 07-10-23 @ 11:53    pt seen and examined  feels better  febrile to 101 yest evening and o/n 101  temps down this am  has fatigue, weakness       ROS: denies dizziness, no HA, no SOB or cough, no abdominal pain, no diarrhea or constipation; no dysuria, no urinary frequency, no legs pain, no rashes    MEDICATIONS  (STANDING):  dextrose 5% + sodium chloride 0.9%. 1000 milliLiter(s) (125 mL/Hr) IV Continuous <Continuous>  doxycycline monohydrate Capsule 100 milliGRAM(s) Oral every 12 hours    Vital Signs Last 24 Hrs  T(C): 36.9 (10 Jul 2023 08:16), Max: 38.6 (09 Jul 2023 17:57)  T(F): 98.4 (10 Jul 2023 08:16), Max: 101.4 (09 Jul 2023 17:57)  HR: 70 (10 Jul 2023 08:16) (70 - 94)  BP: 124/67 (10 Jul 2023 08:16) (103/59 - 125/79)  BP(mean): --  RR: 18 (10 Jul 2023 08:16) (18 - 18)  SpO2: 99% (10 Jul 2023 08:16) (98% - 100%)    Parameters below as of 10 Jul 2023 08:16  Patient On (Oxygen Delivery Method): room air      PE:  Constitutional: NAD   HEENT: NC/AT, EOMI, PERRLA, conjunctivae clear; ears and nose atraumatic; pharynx benign  Neck: supple; thyroid not palpable  Back: no tenderness  Respiratory: respiratory effort normal; clear to auscultation  Cardiovascular: S1S2 regular, no murmurs  Abdomen: soft, not tender, not distended, positive BS; liver and spleen WNL  Genitourinary: no suprapubic tenderness  Lymphatic: no LN palpable  Musculoskeletal: no muscle tenderness, no joint swelling or tenderness  Extremities: no pedal edema  Neurological/ Psychiatric: AxOx3, Judgement and insight normal;  moving all extremities  Skin: no rashes; no palpable lesions    Labs: all available labs reviewed                                              12.6   8.61  )-----------( 198      ( 10 Jul 2023 08:57 )             36.3     07-10    142  |  110<H>  |  8   ----------------------------<  91  3.8   |  27  |  0.87    Ca    8.3<L>      10 Jul 2023 08:57    TPro  6.1  /  Alb  2.8<L>  /  TBili  0.6  /  DBili  x   /  AST  109<H>  /  ALT  298<H>  /  AlkPhos  105  07-10          LIVER FUNCTIONS - ( 08 Jul 2023 08:23 )  Alb: 3.0 g/dL / Pro: 6.3 gm/dL / ALK PHOS: 105 U/L / ALT: 232 U/L / AST: 84 U/L / GGT: x           Urinalysis Basic - ( 08 Jul 2023 08:23 )    Color: x / Appearance: x / SG: x / pH: x  Gluc: 97 mg/dL / Ketone: x  / Bili: x / Urobili: x   Blood: x / Protein: x / Nitrite: x   Leuk Esterase: x / RBC: x / WBC x   Sq Epi: x / Non Sq Epi: x / Bacteria: x    Culture - Blood (07.07.23 @ 16:32)   Specimen Source: .Blood None  Culture Results:   No growth at 48 Hours  Culture - Urine (07.07.23 @ 16:19)   Specimen Source: Clean Catch None  Culture Results:   No growth      Radiology: all available radiological tests reviewed  < from: CT Chest No Cont (07.10.23 @ 10:50) >    ACC: [id number] EXAM:  CT CHEST   ORDERED BY: TERESO JONES     PROCEDURE DATE:  07/10/2023          INTERPRETATION:  HISTORY: Shortness of breath. Rule out pneumonia.    EXAMINATION: CT CHEST was performed without IV contrast.    COMPARISON: None available.    FINDINGS:    AIRWAYS, LUNGS, PLEURA: Clear central airways. No consolidation. Trace   left pleural effusion.    MEDIASTINUM: Normal heart size. No pericardial effusion. Thoracic aorta   normal caliber.  No large mediastinal lymph nodes.    IMAGED ABDOMEN: Partially imaged spleen measures 15.6 cm.    SOFT TISSUES: Bilateral gynecomastia.    BONES: Unremarkable.      IMPRESSION:.    No pneumonia.    Trace left pleural effusion.    Splenomegaly.    Bilateral gynecomastia.    --- Endof Report ---          < end of copied text >    < from: Xray Chest 2 Views PA/Lat (07.07.23 @ 16:00) >    ACC: [id number] EXAM:  XR CHEST PA LAT 2V   ORDERED BY: FROILAN YUSUF     PROCEDURE DATE:  07/07/2023          INTERPRETATION:  PA and lateral chest radiographs    COMPARISON:  NONE.    CLINICAL INFORMATION: Sepsis.    FINDINGS:    PULMONARY: The airway is midline.  There are no airspace consolidations or radiographic evidence of   pulmonary nodules..  No pleural effusion or pneumothorax.    HEART/VASCULAR: The heart size and mediastinum configuration are within   the limits of normal.    BONES: The visualized osseous thorax is intact.    IMPRESSION:    No radiographic evidence of active chest disease..    --- End of Report ---      < end of copied text >    Advanced directives addressed: full resuscitation

## 2023-07-10 NOTE — PROGRESS NOTE ADULT - SUBJECTIVE AND OBJECTIVE BOX
HPI: 28 year old male no known PMHx presents to the [facility] for further evaluation and management of a 2 week history of intractable fevers (TMax 102.8'F) despite taking Acetaminophen at home. The patient was reportedly evaluated last week in the [facility]. Workup including blood work were reportedly negative. The patient was then evaluated by his PCP on Wednesday where the patient had intractable vomiting. The patient was referred to [facility] at that time where the patient reportedly had a negative infectious workup there as well. A CT scan revealed splenomegaly. The patient additionally c/o a 2 week history of headaches. He denies any associated chest pain, palpitations, shortness of breath, abdominal pain, nausea, or recent sick contacts. Vital signs in triage => /76, HR 83/min, RR 18/min, TMax 100.9'F, SpO2 99% on room air. Labs => lymph% 51, AST/ALT 78/253. CXR => No radiographic evidence of active chest disease. In the ED the patient was given Acetaminophen 650mg PO x 1, and NS x 1L.    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    7/9: pt feels weak  temp of 101.2 last night again  hematology + rheumatology consults    7/10: temp 101.4 last night    PHYSICAL EXAM:    Vital Signs Last 24 Hrs  T(C): 38.5 (10 Jul 2023 15:09), Max: 38.6 (09 Jul 2023 17:57)  T(F): 101.3 (10 Jul 2023 15:09), Max: 101.4 (09 Jul 2023 17:57)  HR: 86 (10 Jul 2023 15:09) (70 - 86)  BP: 118/71 (10 Jul 2023 15:09) (103/59 - 124/67)  BP(mean): --  RR: 18 (10 Jul 2023 15:09) (18 - 18)  SpO2: 99% (10 Jul 2023 15:09) (98% - 99%)    Parameters below as of 10 Jul 2023 15:09  Patient On (Oxygen Delivery Method): room air            Constitutional: Weak  appearing  HEENT: Atraumatic, JOSE,   Respiratory: Breath Sounds normal, no rhonchi/wheeze  Cardiovascular: N S1S2;   Gastrointestinal: Abdomen soft, non tender, Bowel Sounds present  Extremities: No edema, peripheral pulses present  Neurological: AAO x 3, no gross focal motor deficits  Skin: Non cellulitic, no rash, ulcers  Lymph Nodes: No lymphadenopathy noted  Back: No CVA tenderness   Musculoskeletal: non tender  Breasts: Deferred  Genitourinary: deferred  Rectal: Deferred    All Labs/EKG/Radiology/Meds reviewed    Lab Results:  CBC  CBC Full  -  ( 10 Jul 2023 08:57 )  WBC Count : 8.61 K/uL  RBC Count : 4.21 M/uL  Hemoglobin : 12.6 g/dL  Hematocrit : 36.3 %  Platelet Count - Automated : 198 K/uL  Mean Cell Volume : 86.2 fl  Mean Cell Hemoglobin : 29.9 pg  Mean Cell Hemoglobin Concentration : 34.7 gm/dL  Auto Neutrophil # : x  Auto Lymphocyte # : x  Auto Monocyte # : x  Auto Eosinophil # : x  Auto Basophil # : x  Auto Neutrophil % : x  Auto Lymphocyte % : x  Auto Monocyte % : x  Auto Eosinophil % : x  Auto Basophil % : x    .		Differential:	[] Automated		[] Manual  Chemistry  07-10    142  |  110<H>  |  8   ----------------------------<  91  3.8   |  27  |  0.87    Ca    8.3<L>      10 Jul 2023 08:57    TPro  6.1  /  Alb  2.8<L>  /  TBili  0.6  /  DBili  x   /  AST  109<H>  /  ALT  298<H>  /  AlkPhos  105  07-10    LIVER FUNCTIONS - ( 10 Jul 2023 08:57 )  Alb: 2.8 g/dL / Pro: 6.1 gm/dL / ALK PHOS: 105 U/L / ALT: 298 U/L / AST: 109 U/L / GGT: x             Urinalysis Basic - ( 10 Jul 2023 08:57 )    Color: x / Appearance: x / SG: x / pH: x  Gluc: 91 mg/dL / Ketone: x  / Bili: x / Urobili: x   Blood: x / Protein: x / Nitrite: x   Leuk Esterase: x / RBC: x / WBC x   Sq Epi: x / Non Sq Epi: x / Bacteria: x        07-09-23 @ 07:01  -  07-10-23 @ 07:00  --------------------------------------------------------  IN: 3000 mL / OUT: 0 mL / NET: 3000 mL      MICROBIOLOGY/CULTURES:  Culture Results:   Testing in progress (07-07 @ 23:42)  Culture Results:   No growth at 48 Hours (07-07 @ 16:32)  Culture Results:   No growth at 48 Hours (07-07 @ 16:19)  Culture Results:   No growth (07-07 @ 16:19)      MEDICATIONS  (STANDING):  dextrose 5% + sodium chloride 0.9%. 1000 milliLiter(s) (125 mL/Hr) IV Continuous <Continuous>  doxycycline monohydrate Capsule 100 milliGRAM(s) Oral every 12 hours    MEDICATIONS  (PRN):  acetaminophen     Tablet .. 650 milliGRAM(s) Oral every 6 hours PRN Temp greater or equal to 38C (100.4F), Mild Pain (1 - 3)  aluminum hydroxide/magnesium hydroxide/simethicone Suspension 30 milliLiter(s) Oral every 4 hours PRN Dyspepsia  melatonin 3 milliGRAM(s) Oral at bedtime PRN Insomnia  ondansetron Injectable 4 milliGRAM(s) IV Push every 8 hours PRN Nausea and/or Vomiting

## 2023-07-10 NOTE — PROGRESS NOTE ADULT - ASSESSMENT
28 year old male no known PMHx presents to the [facility] for further evaluation and management of a 2 week history of intractable fevers (TMax 102.8'F) despite taking Acetaminophen at home. The patient was reportedly evaluated last week in the [facility]. Workup including blood work were reportedly negative. The patient was then evaluated by his PCP on Wednesday where the patient had intractable vomiting. The patient was referred to [facility] at that time where the patient reportedly had a negative infectious workup there as well. A CT scan revealed splenomegaly. The patient additionally c/o a 2 week history of headaches. He denies any associated chest pain, palpitations, shortness of breath, abdominal pain, nausea, or recent sick contacts. Vital signs in triage => /76, HR 83/min, RR 18/min, TMax 100.9'F, SpO2 99% on room air. Labs => lymph% 51, AST/ALT 78/253. CXR => No radiographic evidence of active chest disease. In the ED the patient was given Acetaminophen 650mg PO x 1, and NS x 1L.    1. Fever. Malaise. Splenomegaly. Transamnitis ? Tick borne disease ? Viral syndrome   - babesia pcr (-)  lyme screen (-), ehrlichia, anaplasma pcr  - on doxycycline #3 continue this pending ehrlichia, anaplasma testing   - CT chest reviewed no infectious focus  - esr, rf, wnl   - check CMV, EBV serology  - monitor temps  - f/u cultures - no growth  - tolerating abx well so far; no side effects noted  - reason for abx use and side effects reviewed with patient  - supportive care  - fu cbc    2. other issues - care per medicine

## 2023-07-10 NOTE — CONSULT NOTE ADULT - ASSESSMENT
1. fever, splenomegaly abnormal LFTs  and lymphocytosis  the above are suggestive of a viral infective process  ID evaluation ongoing for CMV, EBV etc    a lymphoproliferative disorder less likely but shall need to be ruled out  shall send flow cytometry to look for any monoclonality of lymphs

## 2023-07-10 NOTE — PROGRESS NOTE ADULT - ASSESSMENT
28 year old male no known PMHx presents to the [facility] for further evaluation and management of a 2 week history of intractable fevers (TMax 102.8'F) despite taking Acetaminophen at home. The patient was reportedly evaluated last week in the [facility]. Workup including blood work were reportedly negative. The patient was then evaluated by his PCP on Wednesday where the patient had intractable vomiting. The patient was referred to [facility] at that time where the patient reportedly had a negative infectious workup there as well. A CT scan revealed splenomegaly. The patient additionally c/o a 2 week history of headaches. He denies any associated chest pain, palpitations, shortness of breath, abdominal pain, nausea, or recent sick contacts There was a noted questionable 5cm tracheal lesion on CT scan but repeat CT scan is without lesion     Plan   No thoracic intervention warranted at this tome  Cont care as per primary team   Heme/Onc, Rhem ID consults appreciated   Will sign off   please reconsult as needed   LYNDSAY Campa

## 2023-07-10 NOTE — PROGRESS NOTE ADULT - ASSESSMENT
28 year old male no known PMHx presents to the [facility] for further evaluation and management of a 2 week history of intractable fevers (TMax 102.8'F) despite taking Acetaminophen at home. The patient was reportedly evaluated last week in the [facility]. Workup including blood work were reportedly negative. The patient was then evaluated by his PCP on Wednesday where the patient had intractable vomiting. The patient was referred to [facility] at that time where the patient reportedly had a negative infectious workup there as well. A CT scan revealed splenomegaly. The patient additionally c/o a 2 week history of headaches. He denies any associated chest pain, palpitations, shortness of breath, abdominal pain, nausea, or recent sick contacts. Vital signs in triage => /76, HR 83/min, RR 18/min, TMax 100.9'F, SpO2 99% on room air. Labs => lymph% 51, AST/ALT 78/253. CXR => No radiographic evidence of active chest disease. In the ED the patient was given Acetaminophen 650mg PO x 1, and NS x 1L.   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

## 2023-07-10 NOTE — CONSULT NOTE ADULT - ASSESSMENT
28F previously healthy male here for evaluation of fever of unknown origin for past 2 weeks which is ongoing. Thus far, infectious workup including blood and urine cultures, and testing for lyme, HIV have been negative.  Rheumatology consulted for further evaluation.    Ddx of FUO includes infectious vs. autoimmune vs. malignant. Pt with no family hx of autoimmune disease, but has grandparents on both sides with history of cancer.   Infectious workup ongoing.  Will await CT chest and other scans from [facility] to be sent over- will need to look for lymphadenopathy. Reportedly splenomegaly was seen on scans.    Ddx of autoimmune/inflammatory conditions includes Adult onset Stills disease (pt already meets 3 of the criteria including persistent fever, splenomegaly and transaminitis, but does not have the typical sore throat or evanescent salmon colored rash typical of Stills), versus HLH/macrophage activation syndrome (will await further labs to determine if pt meets enough crtieria), vs. SLE (though not usually associated with fevers), Sjogrens, ANCA vasculitis (low suspicion), sarcoidosis. Will send off labs for each of these conditions. RA is unlikely as patient denies joint pain/swelling, but will still include testing for this.     Upon discharge patient should follow up at [address] [place] [address] with our rheumatology office (number is [phone]- please include at time of discharge).     Inge Anderson MD  [facility]- Rheumatology at [place]  [phone] 28F previously healthy male here for evaluation of fever of unknown origin for past 2 weeks which is ongoing. Thus far, infectious workup including blood and urine cultures, and testing for lyme, HIV have been negative.  Rheumatology consulted for further evaluation.    Ddx of FUO includes infectious vs. autoimmune vs. malignant. Pt with no family hx of autoimmune disease, but has grandparents on both sides with history of cancer.   Infectious workup ongoing.  Will await CT chest and other scans from [facility] to be sent over- will need to look for lymphadenopathy. Reportedly splenomegaly was seen on scans.    Ddx of autoimmune/inflammatory conditions includes Adult onset Stills disease (pt already meets 3 of the criteria including persistent fever, splenomegaly and transaminitis, but does not have the typical sore throat or evanescent salmon colored rash typical of Stills), versus HLH/macrophage activation syndrome (will await further labs including ferritin, triglycerides, fibrinogen level, NK cells, soluble interleukin 2 receptor- to determine if pt meets enough crtieria), vs. SLE (though not usually associated with fevers), Sjogrens, ANCA vasculitis (low suspicion), sarcoidosis. Will send off labs for each of these conditions. RA is unlikely as patient denies joint pain/swelling, but will still include testing for this.     Upon discharge patient should follow up at [address] [place] [address] with our rheumatology office (number is [phone]- please include at time of discharge).     Inge Anderson MD  [facility]- Rheumatology at [place]  [phone]

## 2023-07-10 NOTE — PROGRESS NOTE ADULT - SUBJECTIVE AND OBJECTIVE BOX
Patient is a 28y old  Male who presents with a chief complaint of Intractable Fevers (08 Jul 2023 14:52)      HPI:  28 year old male no known PMHx presents to the [facility] for further evaluation and management of a 2 week history of intractable fevers (TMax 102.8'F) despite taking Acetaminophen at home. The patient was reportedly evaluated last week in the [facility]. Workup including blood work were reportedly negative. The patient was then evaluated by his PCP on Wednesday where the patient had intractable vomiting. The patient was referred to [facility] at that time where the patient reportedly had a negative infectious workup there as well. A CT scan revealed splenomegaly. The patient additionally c/o a 2 week history of headaches. He denies any associated chest pain, palpitations, shortness of breath, abdominal pain, nausea, or recent sick contacts. Vital signs in triage => /76, HR 83/min, RR 18/min, TMax 100.9'F, SpO2 99% on room air. Labs => lymph% 51, AST/ALT 78/253. CXR => No radiographic evidence of active chest disease. In the ED the patient was given Acetaminophen 650mg PO x 1, and NS x 1L.   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 1000 milliLiter(s) (125 mL/Hr) IV Continuous <Continuous>  doxycycline monohydrate Capsule 100 milliGRAM(s) Oral every 12 hours    MEDICATIONS  (PRN):  acetaminophen     Tablet .. 650 milliGRAM(s) Oral every 6 hours PRN Temp greater or equal to 38C (100.4F), Mild Pain (1 - 3)  aluminum hydroxide/magnesium hydroxide/simethicone Suspension 30 milliLiter(s) Oral every 4 hours PRN Dyspepsia  melatonin 3 milliGRAM(s) Oral at bedtime PRN Insomnia  ondansetron Injectable 4 milliGRAM(s) IV Push every 8 hours PRN Nausea and/or Vomiting      FAMILY HISTORY:  No pertinent family history in first degree relatives        SOCIAL HISTORY:  ***    REVIEW OF SYSTEM:  Pertinent items are noted in HPI.  Constitutional negative for chills, fevers, sweats and weight loss  throat, and face:  negative for epistaxis, nasal congestion, sore throat and   tinnitus  Respiratory: negative for cough, dyspnea on exertion, pleuritic chest pain  and wheezing  Cardiovascular:  negative for chest pain, dyspnea and palpitations  Gastrointestinal: negative for abdominal pain, diarrhea, nausea and vomiting  Genitourinary: negative for dysuria, frequency and urinary incontinence  Skin:  negative for redness, rash, pruritus, swelling, dryness and   fissures  Hematologic/lymphatic: negative for bleeding and easy bruising  Musculoskeletal: negative for arthralgias, back pain and muscle weakness  Neurological: negative for dizziness, headaches, seizures and tremors  Behavioral/Psych:  negative for mood change, depression, suicidal attempts    Allergic/Immunologic: negative for anaphylaxis, angioedema and urticaria    Vital Signs Last 24 Hrs  T(C): 36.9 (10 Jul 2023 08:16), Max: 38.6 (09 Jul 2023 17:57)  T(F): 98.4 (10 Jul 2023 08:16), Max: 101.4 (09 Jul 2023 17:57)  HR: 70 (10 Jul 2023 08:16) (70 - 94)  BP: 124/67 (10 Jul 2023 08:16) (103/59 - 125/79)  BP(mean): --  RR: 18 (10 Jul 2023 08:16) (18 - 18)  SpO2: 99% (10 Jul 2023 08:16) (98% - 100%)    Parameters below as of 10 Jul 2023 08:16  Patient On (Oxygen Delivery Method): room air          I&O's Summary    08 Jul 2023 07:01  -  09 Jul 2023 07:00  --------------------------------------------------------  IN: 2650 mL / OUT: 0 mL / NET: 2650 mL      PHYSICAL EXAM  General Appearance: cooperative, no acute distress,   HEENT: PERRL, conjunctiva clear, EOM's intact, non injected pharynx, no exudate, TM   normal  Neck: Supple, , no adenopathy, thyroid: not enlarged, no carotid bruit or JVD  Back: Symmetric, no  tenderness,no soft tissue tenderness  Lungs: Clear to auscultation bilateral,no adventitious breath sounds, normal   expiratory phase  Heart: Regular rate and rhythm, S1, S2 normal, no murmur, rub or gallop  Abdomen: Soft, non-tender, bowel sounds active , no hepatosplenomegaly  Extremities: no cyanosis or edema, no joint swelling  Skin: Skin color, texture normal, no rashes   Neurologic: Alert and oriented X3 , cranial nerves intact, sensory and motor normal,    ECG:    LABS:                          12.2   8.75  )-----------( 200      ( 09 Jul 2023 08:38 )             35.9     07-09    139  |  109<H>  |  9   ----------------------------<  99  4.2   |  26  |  0.89    Ca    8.3<L>      09 Jul 2023 07:43    TPro  5.9<L>  /  Alb  2.8<L>  /  TBili  0.6  /  DBili  x   /  AST  91<H>  /  ALT  240<H>  /  AlkPhos  97  07-09            PT/INR - ( 07 Jul 2023 16:19 )   PT: 13.7 sec;   INR: 1.18 ratio         PTT - ( 07 Jul 2023 16:19 )  PTT:34.0 sec  Urinalysis Basic - ( 09 Jul 2023 07:43 )    Color: x / Appearance: x / SG: x / pH: x  Gluc: 99 mg/dL / Ketone: x  / Bili: x / Urobili: x   Blood: x / Protein: x / Nitrite: x   Leuk Esterase: x / RBC: x / WBC x   Sq Epi: x / Non Sq Epi: x / Bacteria: x            RADIOLOGY & ADDITIONAL STUDIES:

## 2023-07-11 ENCOUNTER — APPOINTMENT (OUTPATIENT)
Dept: THORACIC SURGERY | Facility: HOSPITAL | Age: 28
End: 2023-07-11

## 2023-07-11 LAB
A PHAGOCYTOPH DNA BLD QL NAA+PROBE: NEGATIVE — SIGNIFICANT CHANGE UP
ACE SERPL-CCNC: 83 U/L — HIGH (ref 14–82)
ANTI-RIBONUCLEAR PROTEIN: <0.2 AI — SIGNIFICANT CHANGE UP
CCP IGG SERPL-ACNC: 10 UNITS — SIGNIFICANT CHANGE UP
CMV IGG FLD QL: 0.67 U/ML — HIGH
CMV IGG SERPL-IMP: ABNORMAL
CMV IGM FLD-ACNC: >240 AU/ML — HIGH
CMV IGM SERPL QL: POSITIVE
DSDNA AB FLD-ACNC: <0.2 AI — SIGNIFICANT CHANGE UP
DSDNA AB SER-ACNC: <12 IU/ML — SIGNIFICANT CHANGE UP
E CHAFFEENSIS DNA BLD QL NAA+PROBE: NEGATIVE — SIGNIFICANT CHANGE UP
E EWINGII DNA SPEC QL NAA+PROBE: NEGATIVE — SIGNIFICANT CHANGE UP
EBV EA AB SER IA-ACNC: <5 U/ML — SIGNIFICANT CHANGE UP
EBV EA AB TITR SER IF: POSITIVE
EBV EA IGG SER-ACNC: NEGATIVE — SIGNIFICANT CHANGE UP
EBV NA IGG SER IA-ACNC: 166 U/ML — HIGH
EBV PATRN SPEC IB-IMP: SIGNIFICANT CHANGE UP
EBV VCA IGG AVIDITY SER QL IA: POSITIVE
EBV VCA IGM SER IA-ACNC: 165 U/ML — HIGH
EBV VCA IGM SER IA-ACNC: 21.6 U/ML — SIGNIFICANT CHANGE UP
EBV VCA IGM TITR FLD: NEGATIVE — SIGNIFICANT CHANGE UP
EHRLICHIA DNA SPEC QL NAA+PROBE: NEGATIVE — SIGNIFICANT CHANGE UP
ENA SM AB FLD QL: <0.2 AI — SIGNIFICANT CHANGE UP
ENA SS-A AB FLD IA-ACNC: <0.2 AI — SIGNIFICANT CHANGE UP
GAMMA INTERFERON BACKGROUND BLD IA-ACNC: 0.33 IU/ML — SIGNIFICANT CHANGE UP
M TB IFN-G BLD-IMP: NEGATIVE — SIGNIFICANT CHANGE UP
M TB IFN-G CD4+ BCKGRND COR BLD-ACNC: -0.02 IU/ML — SIGNIFICANT CHANGE UP
M TB IFN-G CD4+CD8+ BCKGRND COR BLD-ACNC: 0 IU/ML — SIGNIFICANT CHANGE UP
MPO AB + PR3 PNL SER: SIGNIFICANT CHANGE UP
QUANT TB PLUS MITOGEN MINUS NIL: 7.86 IU/ML — SIGNIFICANT CHANGE UP
RF+CCP IGG SER-IMP: NEGATIVE — SIGNIFICANT CHANGE UP
VIT D25+D1,25 OH+D1,25 PNL SERPL-MCNC: 65.5 PG/ML — SIGNIFICANT CHANGE UP (ref 19.9–79.3)

## 2023-07-11 PROCEDURE — 99233 SBSQ HOSP IP/OBS HIGH 50: CPT

## 2023-07-11 RX ORDER — FAMOTIDINE 10 MG/ML
20 INJECTION INTRAVENOUS DAILY
Refills: 0 | Status: DISCONTINUED | OUTPATIENT
Start: 2023-07-11 | End: 2023-07-12

## 2023-07-11 RX ORDER — IBUPROFEN 200 MG
400 TABLET ORAL EVERY 8 HOURS
Refills: 0 | Status: DISCONTINUED | OUTPATIENT
Start: 2023-07-11 | End: 2023-07-12

## 2023-07-11 RX ADMIN — Medication 650 MILLIGRAM(S): at 14:04

## 2023-07-11 RX ADMIN — SODIUM CHLORIDE 125 MILLILITER(S): 9 INJECTION, SOLUTION INTRAVENOUS at 02:38

## 2023-07-11 RX ADMIN — Medication 400 MILLIGRAM(S): at 22:12

## 2023-07-11 RX ADMIN — Medication 650 MILLIGRAM(S): at 03:38

## 2023-07-11 RX ADMIN — Medication 650 MILLIGRAM(S): at 14:00

## 2023-07-11 RX ADMIN — SODIUM CHLORIDE 125 MILLILITER(S): 9 INJECTION, SOLUTION INTRAVENOUS at 12:30

## 2023-07-11 RX ADMIN — Medication 100 MILLIGRAM(S): at 22:02

## 2023-07-11 RX ADMIN — Medication 650 MILLIGRAM(S): at 02:01

## 2023-07-11 RX ADMIN — Medication 100 MILLIGRAM(S): at 09:47

## 2023-07-11 RX ADMIN — Medication 400 MILLIGRAM(S): at 20:47

## 2023-07-11 NOTE — PROGRESS NOTE ADULT - SUBJECTIVE AND OBJECTIVE BOX
HPI: 28 year old male no known PMHx presents to the [facility] for further evaluation and management of a 2 week history of intractable fevers (TMax 102.8'F) despite taking Acetaminophen at home. The patient was reportedly evaluated last week in the [facility]. Workup including blood work were reportedly negative. The patient was then evaluated by his PCP on Wednesday where the patient had intractable vomiting. The patient was referred to [facility] at that time where the patient reportedly had a negative infectious workup there as well. A CT scan revealed splenomegaly. The patient additionally c/o a 2 week history of headaches. He denies any associated chest pain, palpitations, shortness of breath, abdominal pain, nausea, or recent sick contacts. Vital signs in triage => /76, HR 83/min, RR 18/min, TMax 100.9'F, SpO2 99% on room air. Labs => lymph% 51, AST/ALT 78/253. CXR => No radiographic evidence of active chest disease. In the ED the patient was given Acetaminophen 650mg PO x 1, and NS x 1L.    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 Feeling better but still with fatigue.   CMV: +ve   otherworkup positive     PHYSICAL EXAM:    Vital Signs Last 24 Hrs  T(C): 36.8 (11 Jul 2023 08:22), Max: 38.5 (10 Jul 2023 15:09)  T(F): 98.2 (11 Jul 2023 08:22), Max: 101.3 (10 Jul 2023 15:09)  HR: 70 (11 Jul 2023 08:22) (70 - 86)  BP: 111/75 (11 Jul 2023 08:22) (101/56 - 118/71)  RR: 18 (11 Jul 2023 08:22) (18 - 18)  SpO2: 100% (11 Jul 2023 08:22) (95% - 100%)    Parameters below as of 11 Jul 2023 08:22  Patient On (Oxygen Delivery Method): room air      Constitutional: Weak  appearing  HEENT: Atraumatic, JOSE,   Respiratory: Breath Sounds normal, no rhonchi/wheeze  Cardiovascular: N S1S2;   Gastrointestinal: Abdomen soft, non tender, Bowel Sounds present  Extremities: No edema, peripheral pulses present  Neurological: AAO x 3, no gross focal motor deficits  Skin: Non cellulitic, no rash, ulcers  Lymph Nodes: No lymphadenopathy noted  Back: No CVA tenderness   Musculoskeletal: non tender  Breasts: Deferred  Genitourinary: deferred  Rectal: Deferred    All Labs/EKG/Radiology/Meds reviewed                        12.6   8.61  )-----------( 198      ( 10 Jul 2023 08:57 )             36.3   07-10    142  |  110<H>  |  8   ----------------------------<  91  3.8   |  27  |  0.87    Ca    8.3<L>      10 Jul 2023 08:57    TPro  6.1  /  Alb  2.8<L>  /  TBili  0.6  /  DBili  x   /  AST  109<H>  /  ALT  298<H>  /  AlkPhos  105  07-10    Urinalysis Basic - ( 10 Jul 2023 08:57 )    Color: x / Appearance: x / SG: x / pH: x  Gluc: 91 mg/dL / Ketone: x  / Bili: x / Urobili: x   Blood: x / Protein: x / Nitrite: x   Leuk Esterase: x / RBC: x / WBC x   Sq Epi: x / Non Sq Epi: x / Bacteria: x      I&O's Detail    10 Jul 2023 07:01  -  11 Jul 2023 07:00  --------------------------------------------------------  IN:    dextrose 5% + sodium chloride 0.9%: 1300 mL  Total IN: 1300 mL    OUT:  Total OUT: 0 mL    Total NET: 1300 mL      11 Jul 2023 07:01  -  11 Jul 2023 15:02  --------------------------------------------------------  IN:    dextrose 5% + sodium chloride 0.9%: 1500 mL  Total IN: 1500 mL    OUT:  Total OUT: 0 mL    Total NET: 1500 mL    Culture - Fungal, Blood (07.07.23 @ 23:42)    Specimen Source: .Blood Blood-Peripheral   Culture Results:   Testing in progress    Culture - Blood (07.07.23 @ 16:32)    Specimen Source: .Blood None   Culture Results:   No growth at 72 Hours    Culture - Urine (07.07.23 @ 16:19)    Specimen Source: Clean Catch None   Culture Results:   No growth      MEDICATIONS  (STANDING):  dextrose 5% + sodium chloride 0.9%. 1000 milliLiter(s) (125 mL/Hr) IV Continuous <Continuous>  doxycycline monohydrate Capsule 100 milliGRAM(s) Oral every 12 hours    MEDICATIONS  (PRN):  aluminum hydroxide/magnesium hydroxide/simethicone Suspension 30 milliLiter(s) Oral every 4 hours PRN Dyspepsia  melatonin 3 milliGRAM(s) Oral at bedtime PRN Insomnia  ondansetron Injectable 4 milliGRAM(s) IV Push every 8 hours PRN Nausea and/or Vomiting

## 2023-07-11 NOTE — PROGRESS NOTE ADULT - SUBJECTIVE AND OBJECTIVE BOX
Patient is a 28y old  Male who presents with a chief complaint of Intractable Fevers (08 Jul 2023 14:52)      HPI:  28 year old male no known PMHx presents to the [facility] for further evaluation and management of a 2 week history of intractable fevers (TMax 102.8'F) despite taking Acetaminophen at home. The patient was reportedly evaluated last week in the [facility]. Workup including blood work were reportedly negative. The patient was then evaluated by his PCP on Wednesday where the patient had intractable vomiting. The patient was referred to [facility] at that time where the patient reportedly had a negative infectious workup there as well. A CT scan revealed splenomegaly. The patient additionally c/o a 2 week history of headaches. He denies any associated chest pain, palpitations, shortness of breath, abdominal pain, nausea, or recent sick contacts. Vital signs in triage => /76, HR 83/min, RR 18/min, TMax 100.9'F, SpO2 99% on room air. Labs => lymph% 51, AST/ALT 78/253. CXR => No radiographic evidence of active chest disease. In the ED the patient was given Acetaminophen 650mg PO x 1, and NS x 1L.   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 1000 milliLiter(s) (125 mL/Hr) IV Continuous <Continuous>  doxycycline monohydrate Capsule 100 milliGRAM(s) Oral every 12 hours    MEDICATIONS  (PRN):  acetaminophen     Tablet .. 650 milliGRAM(s) Oral every 6 hours PRN Temp greater or equal to 38C (100.4F), Mild Pain (1 - 3)  aluminum hydroxide/magnesium hydroxide/simethicone Suspension 30 milliLiter(s) Oral every 4 hours PRN Dyspepsia  melatonin 3 milliGRAM(s) Oral at bedtime PRN Insomnia  ondansetron Injectable 4 milliGRAM(s) IV Push every 8 hours PRN Nausea and/or Vomiting      FAMILY HISTORY:  No pertinent family history in first degree relatives        SOCIAL HISTORY:  ***    REVIEW OF SYSTEM:  Pertinent items are noted in HPI.  Constitutional negative for chills, fevers, sweats and weight loss  throat, and face:  negative for epistaxis, nasal congestion, sore throat and   tinnitus  Respiratory: negative for cough, dyspnea on exertion, pleuritic chest pain  and wheezing  Cardiovascular:  negative for chest pain, dyspnea and palpitations  Gastrointestinal: negative for abdominal pain, diarrhea, nausea and vomiting  Genitourinary: negative for dysuria, frequency and urinary incontinence  Skin:  negative for redness, rash, pruritus, swelling, dryness and   fissures  Hematologic/lymphatic: negative for bleeding and easy bruising  Musculoskeletal: negative for arthralgias, back pain and muscle weakness  Neurological: negative for dizziness, headaches, seizures and tremors  Behavioral/Psych:  negative for mood change, depression, suicidal attempts    Allergic/Immunologic: negative for anaphylaxis, angioedema and urticaria    Vital Signs Last 24 Hrs  T(C): 36.7 (11 Jul 2023 05:27), Max: 38.5 (10 Jul 2023 15:09)  T(F): 98.1 (11 Jul 2023 05:27), Max: 101.3 (10 Jul 2023 15:09)  HR: 77 (11 Jul 2023 02:01) (70 - 86)  BP: 101/56 (11 Jul 2023 02:01) (101/56 - 124/67)  BP(mean): --  RR: 18 (10 Jul 2023 22:24) (18 - 18)  SpO2: 95% (11 Jul 2023 02:01) (95% - 100%)    Parameters below as of 11 Jul 2023 02:01  Patient On (Oxygen Delivery Method): room air          PHYSICAL EXAM  General Appearance: cooperative, no acute distress, febrile episodes noted  HEENT: PERRL, conjunctiva clear, EOM's intact, non injected pharynx, no exudate, TM   normal  Neck: Supple, , no adenopathy, thyroid: not enlarged, no carotid bruit or JVD  Back: Symmetric, no  tenderness,no soft tissue tenderness  Lungs: Clear to auscultation bilateral,no adventitious breath sounds, normal   expiratory phase  Heart: Regular rate and rhythm, S1, S2 normal, no murmur, rub or gallop  Abdomen: Soft, non-tender, bowel sounds active , no hepatosplenomegaly  Extremities: no cyanosis or edema, no joint swelling  Skin: Skin color, texture normal, no rashes   Neurologic: Alert and oriented X3 , cranial nerves intact, sensory and motor normal,    ECG:    LABS:                          12.2   8.75  )-----------( 200      ( 09 Jul 2023 08:38 )             35.9     07-09    139  |  109<H>  |  9   ----------------------------<  99  4.2   |  26  |  0.89    Ca    8.3<L>      09 Jul 2023 07:43    TPro  5.9<L>  /  Alb  2.8<L>  /  TBili  0.6  /  DBili  x   /  AST  91<H>  /  ALT  240<H>  /  AlkPhos  97  07-09            PT/INR - ( 07 Jul 2023 16:19 )   PT: 13.7 sec;   INR: 1.18 ratio         PTT - ( 07 Jul 2023 16:19 )  PTT:34.0 sec  Urinalysis Basic - ( 09 Jul 2023 07:43 )    Color: x / Appearance: x / SG: x / pH: x  Gluc: 99 mg/dL / Ketone: x  / Bili: x / Urobili: x   Blood: x / Protein: x / Nitrite: x   Leuk Esterase: x / RBC: x / WBC x   Sq Epi: x / Non Sq Epi: x / Bacteria: x            RADIOLOGY & ADDITIONAL STUDIES:

## 2023-07-11 NOTE — PROGRESS NOTE ADULT - ASSESSMENT
1. fever, splenomegaly abnormal LFTs  and lymphocytosis  the above are suggestive of a viral infective process  ID evaluation ongoing   CMV serologies positive, may have self limited viral illness  Awaiting final tick borne illness work up  Continuing doxycyclien    a lymphoproliferative disorder less likely but shall need to be ruled out  flow cytometry sent yesterday, we will f/u results    Thank you for the courtesy of this consultation and we will continue to follow.    Valdo Rosenberg MD  [facility]  Cell: [phone]

## 2023-07-11 NOTE — PROGRESS NOTE ADULT - ASSESSMENT
28 year old male no known PMHx presents to the [facility] for further evaluation and management of a 2 week history of intractable fevers (TMax 102.8'F) despite taking Acetaminophen at home. The patient was reportedly evaluated last week in the [facility]. Workup including blood work were reportedly negative. The patient was then evaluated by his PCP on Wednesday where the patient had intractable vomiting. The patient was referred to [facility] at that time where the patient reportedly had a negative infectious workup there as well. A CT scan revealed splenomegaly. The patient additionally c/o a 2 week history of headaches. He denies any associated chest pain, palpitations, shortness of breath, abdominal pain, nausea, or recent sick contacts. Vital signs in triage => /76, HR 83/min, RR 18/min, TMax 100.9'F, SpO2 99% on room air. Labs => lymph% 51, AST/ALT 78/253. CXR => No radiographic evidence of active chest disease. In the ED the patient was given Acetaminophen 650mg PO x 1, and NS x 1L.    1. Fever. Malaise. Splenomegaly. Transamnitis. Viral syndrome. CMV  - CMV serology positive symptoms, lab findings can be d/t this - self limited viral syndrome  - babesia pcr (-)  lyme screen (-)  - on doxycycline #4 continue this pending ehrlichia, anaplasma testing   - CT chest reviewed no infectious focus  - esr, rf, wnl   - monitor temps  - f/u cultures - no growth  - tolerating abx well so far; no side effects noted  - reason for abx use and side effects reviewed with patient  - supportive care  - fu cbc    2. other issues - care per medicine

## 2023-07-11 NOTE — PROGRESS NOTE ADULT - SUBJECTIVE AND OBJECTIVE BOX
Date of service: 07-11-23 @ 11:19    pt seen and examined  feels better  febrile o/n low grade   has fatigue, weakness     ROS: denies dizziness, no HA, no SOB or cough, no abdominal pain, no diarrhea or constipation; no dysuria, no urinary frequency, no legs pain, no rashes    MEDICATIONS  (STANDING):  dextrose 5% + sodium chloride 0.9%. 1000 milliLiter(s) (125 mL/Hr) IV Continuous <Continuous>  doxycycline monohydrate Capsule 100 milliGRAM(s) Oral every 12 hours    Vital Signs Last 24 Hrs  T(C): 36.8 (11 Jul 2023 08:22), Max: 38.5 (10 Jul 2023 15:09)  T(F): 98.2 (11 Jul 2023 08:22), Max: 101.3 (10 Jul 2023 15:09)  HR: 70 (11 Jul 2023 08:22) (70 - 86)  BP: 111/75 (11 Jul 2023 08:22) (101/56 - 118/71)  BP(mean): --  RR: 18 (11 Jul 2023 08:22) (18 - 18)  SpO2: 100% (11 Jul 2023 08:22) (95% - 100%)    Parameters below as of 11 Jul 2023 08:22  Patient On (Oxygen Delivery Method): room air      PE:  Constitutional: NAD   HEENT: NC/AT, EOMI, PERRLA, conjunctivae clear; ears and nose atraumatic; pharynx benign  Neck: supple; thyroid not palpable  Back: no tenderness  Respiratory: respiratory effort normal; clear to auscultation  Cardiovascular: S1S2 regular, no murmurs  Abdomen: soft, not tender, not distended, positive BS; liver and spleen WNL  Genitourinary: no suprapubic tenderness  Lymphatic: no LN palpable  Musculoskeletal: no muscle tenderness, no joint swelling or tenderness  Extremities: no pedal edema  Neurological/ Psychiatric: AxOx3, Judgement and insight normal;  moving all extremities  Skin: no rashes; no palpable lesions    Labs: all available labs reviewed                                              12.6   8.61  )-----------( 198      ( 10 Jul 2023 08:57 )             36.3     07-10    142  |  110<H>  |  8   ----------------------------<  91  3.8   |  27  |  0.87    Ca    8.3<L>      10 Jul 2023 08:57    TPro  6.1  /  Alb  2.8<L>  /  TBili  0.6  /  DBili  x   /  AST  109<H>  /  ALT  298<H>  /  AlkPhos  105  07-10            LIVER FUNCTIONS - ( 08 Jul 2023 08:23 )  Alb: 3.0 g/dL / Pro: 6.3 gm/dL / ALK PHOS: 105 U/L / ALT: 232 U/L / AST: 84 U/L / GGT: x           Urinalysis Basic - ( 08 Jul 2023 08:23 )    Color: x / Appearance: x / SG: x / pH: x  Gluc: 97 mg/dL / Ketone: x  / Bili: x / Urobili: x   Blood: x / Protein: x / Nitrite: x   Leuk Esterase: x / RBC: x / WBC x   Sq Epi: x / Non Sq Epi: x / Bacteria: x    Culture - Blood (07.07.23 @ 16:32)   Specimen Source: .Blood None  Culture Results:   No growth at 48 Hours  Culture - Urine (07.07.23 @ 16:19)   Specimen Source: Clean Catch None  Culture Results:   No growth      Radiology: all available radiological tests reviewed  < from: CT Chest No Cont (07.10.23 @ 10:50) >    ACC: [id number] EXAM:  CT CHEST   ORDERED BY: TERESO JONES     PROCEDURE DATE:  07/10/2023          INTERPRETATION:  HISTORY: Shortness of breath. Rule out pneumonia.    EXAMINATION: CT CHEST was performed without IV contrast.    COMPARISON: None available.    FINDINGS:    AIRWAYS, LUNGS, PLEURA: Clear central airways. No consolidation. Trace   left pleural effusion.    MEDIASTINUM: Normal heart size. No pericardial effusion. Thoracic aorta   normal caliber.  No large mediastinal lymph nodes.    IMAGED ABDOMEN: Partially imaged spleen measures 15.6 cm.    SOFT TISSUES: Bilateral gynecomastia.    BONES: Unremarkable.      IMPRESSION:.    No pneumonia.    Trace left pleural effusion.    Splenomegaly.    Bilateral gynecomastia.    --- Endof Report ---          < end of copied text >    < from: Xray Chest 2 Views PA/Lat (07.07.23 @ 16:00) >    ACC: [id number] EXAM:  XR CHEST PA LAT 2V   ORDERED BY: FROILAN YUSUF     PROCEDURE DATE:  07/07/2023          INTERPRETATION:  PA and lateral chest radiographs    COMPARISON:  NONE.    CLINICAL INFORMATION: Sepsis.    FINDINGS:    PULMONARY: The airway is midline.  There are no airspace consolidations or radiographic evidence of   pulmonary nodules..  No pleural effusion or pneumothorax.    HEART/VASCULAR: The heart size and mediastinum configuration are within   the limits of normal.    BONES: The visualized osseous thorax is intact.    IMPRESSION:    No radiographic evidence of active chest disease..    --- End of Report ---      < end of copied text >    Advanced directives addressed: full resuscitation

## 2023-07-11 NOTE — PROGRESS NOTE ADULT - ASSESSMENT
28 year old male no known PMHx presents to the [facility] for further evaluation and management of a 2 week history of intractable fevers (TMax 102.8'F) despite taking Acetaminophen at home. The patient was reportedly evaluated last week in the [facility]. Workup including blood work were reportedly negative. The patient was then evaluated by his PCP on Wednesday where the patient had intractable vomiting. The patient was referred to [facility] at that time where the patient reportedly had a negative infectious workup there as well. A CT scan revealed splenomegaly. The patient additionally c/o a 2 week history of headaches. He denies any associated chest pain, palpitations, shortness of breath, abdominal pain, nausea, or recent sick contacts. Vital signs in triage => /76, HR 83/min, RR 18/min, TMax 100.9'F, SpO2 99% on room air. Labs => lymph% 51, AST/ALT 78/253. CXR => No radiographic evidence of active chest disease. In the ED the patient was given Acetaminophen 650mg PO x 1, and NS x 1L.    #Fever of Unknown Origin +massive splenomegaly:    -likely secondary to due to CMV: +transaminitis, splenomegaly, +smear RV: atypical lymphocytes seen no obvious red cell inclusions, heterophile: neg   -Blood C+S: neg  -fungal C+S: neg  -Urinalysis neg  -Urine C+S, neg  -Lyme Vise IgG/IgM AB Reflex/babesia:  neg  -hepatitis panel: negative   -HIV: negative   -ESR/CRP/MARISOL/RF/SM/SS-A/B: negative   -f/u Enrlichea/Anaplasma PCR awaited  -serum protein electrophersis: negative   -IV hydration   -hold Acetaminophen for now   -cont. 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 SCDs for now     poc discussed with pt, team, Dr. Pickens , Dr. Guzman, CT Sx NP.  28 year old male no known PMHx presents to the [facility] for further evaluation and management of a 2 week history of intractable fevers (TMax 102.8'F) despite taking Acetaminophen at home. The patient was reportedly evaluated last week in the [facility]. Workup including blood work were reportedly negative. The patient was then evaluated by his PCP on Wednesday where the patient had intractable vomiting. The patient was referred to [facility] at that time where the patient reportedly had a negative infectious workup there as well. A CT scan revealed splenomegaly. The patient additionally c/o a 2 week history of headaches. He denies any associated chest pain, palpitations, shortness of breath, abdominal pain, nausea, or recent sick contacts. Vital signs in triage => /76, HR 83/min, RR 18/min, TMax 100.9'F, SpO2 99% on room air. Labs => lymph% 51, AST/ALT 78/253. CXR => No radiographic evidence of active chest disease. In the ED the patient was given Acetaminophen 650mg PO x 1, and NS x 1L.    #Fever of Unknown Origin, +spenomegaly  -likely secondary to due to CMV: +transaminitis, splenomegaly, +smear RV: atypical lymphocytes seen no obvious red cell inclusions, heterophile: neg   -Blood C+S: neg  -fungal C+S: neg  -Urinalysis neg  -Urine C+S, neg  -Lyme Vise IgG/IgM AB Reflex/babesia:  neg  -hepatitis panel: negative   -HIV: negative   -ESR/CRP/MARISOL/RF/SM/SS-A/B: negative   -f/u Enrlichea/Anaplasma PCR awaited  -serum protein electrophoresis negative   -IV hydration   -hold Acetaminophen for now   -cont. 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 SCDs for now     poc discussed with pt, team, Dr. Pickens , Dr. Guzman, CT Sx NP.

## 2023-07-11 NOTE — PROGRESS NOTE ADULT - SUBJECTIVE AND OBJECTIVE BOX
Hematology/Oncology    Pt seen, had fever this am but lower   No cp, no sob, no diarrhe    MEDICATIONS  (STANDING):  dextrose 5% + sodium chloride 0.9%. 1000 milliLiter(s) (125 mL/Hr) IV Continuous <Continuous>  doxycycline monohydrate Capsule 100 milliGRAM(s) Oral every 12 hours    MEDICATIONS  (PRN):  acetaminophen     Tablet .. 650 milliGRAM(s) Oral every 6 hours PRN Temp greater or equal to 38C (100.4F), Mild Pain (1 - 3)  aluminum hydroxide/magnesium hydroxide/simethicone Suspension 30 milliLiter(s) Oral every 4 hours PRN Dyspepsia  melatonin 3 milliGRAM(s) Oral at bedtime PRN Insomnia  ondansetron Injectable 4 milliGRAM(s) IV Push every 8 hours PRN Nausea and/or Vomiting      ROS  + fever    No rash  No anxiety  No back pain, joint pain  No bleeding, bruising  No dysuria, hematuria    Vital Signs Last 24 Hrs  T(C): 36.8 (11 Jul 2023 08:22), Max: 38.5 (10 Jul 2023 15:09)  T(F): 98.2 (11 Jul 2023 08:22), Max: 101.3 (10 Jul 2023 15:09)  HR: 70 (11 Jul 2023 08:22) (70 - 86)  BP: 111/75 (11 Jul 2023 08:22) (101/56 - 118/71)  BP(mean): --  RR: 18 (11 Jul 2023 08:22) (18 - 18)  SpO2: 100% (11 Jul 2023 08:22) (95% - 100%)    Parameters below as of 11 Jul 2023 08:22  Patient On (Oxygen Delivery Method): room air        PE  NAD  Awake, alert  Anicteric, MMM  RRR  CTAB  Abd soft, NT, ND  No c/c/e  No rash grossly  FROM                          12.6   8.61  )-----------( 198      ( 10 Jul 2023 08:57 )             36.3       07-10    142  |  110<H>  |  8   ----------------------------<  91  3.8   |  27  |  0.87    Ca    8.3<L>      10 Jul 2023 08:57    TPro  6.1  /  Alb  2.8<L>  /  TBili  0.6  /  DBili  x   /  AST  109<H>  /  ALT  298<H>  /  AlkPhos  105  07-10

## 2023-07-11 NOTE — PROGRESS NOTE ADULT - ASSESSMENT
28 year old male no known PMHx presents to the [facility] for further evaluation and management of a 2 week history of intractable fevers (TMax 102.8'F) despite taking Acetaminophen at home. The patient was reportedly evaluated last week in the [facility]. Workup including blood work were reportedly negative. The patient was then evaluated by his PCP on Wednesday where the patient had intractable vomiting. The patient was referred to [facility] at that time where the patient reportedly had a negative infectious workup there as well. A CT scan revealed splenomegaly. The patient additionally c/o a 2 week history of headaches. He denies any associated chest pain, palpitations, shortness of breath, abdominal pain, nausea, or recent sick contacts. Vital signs in triage => /76, HR 83/min, RR 18/min, TMax 100.9'F, SpO2 99% on room air. Labs => lymph% 51, AST/ALT 78/253. CXR => No radiographic evidence of active chest disease. In the ED the patient was given Acetaminophen 650mg PO x 1, and NS x 1L.   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 called    Assessment / plan:  Fever 101-102 x 2-3 weeks  etiology remains obscured  r/o tick born illness  no evidence of sinusitis or pneumonia  mild cough  Tracheal lesion seen on ct chest  r/o pulm HTN based on PA possible enlargement    ECHO suggested  thoracic surgery eval needed and awaiting downloaded films to see if bronch needed  films are done at [facility] and can be downloaded for review  fu cultures and ID recommendations  IVF hydration for ongoing fever    will have outpt ct scan downloaded  suspected viral illness with fever and splenomegaly and lymphocytosis

## 2023-07-12 ENCOUNTER — TRANSCRIPTION ENCOUNTER (OUTPATIENT)
Age: 28
End: 2023-07-12

## 2023-07-12 VITALS
RESPIRATION RATE: 18 BRPM | HEART RATE: 72 BPM | OXYGEN SATURATION: 98 % | DIASTOLIC BLOOD PRESSURE: 61 MMHG | TEMPERATURE: 99 F | SYSTOLIC BLOOD PRESSURE: 107 MMHG

## 2023-07-12 LAB
A PHAGOCYTOPH IGG TITR SER IF: SIGNIFICANT CHANGE UP TITER
ANA TITR SER: NEGATIVE — SIGNIFICANT CHANGE UP
AUTO DIFF PNL BLD: NEGATIVE — SIGNIFICANT CHANGE UP
B BURGDOR AB SER QL IA: POSITIVE — SIGNIFICANT CHANGE UP
B MICROTI IGG TITR SER: SIGNIFICANT CHANGE UP TITER
C-ANCA SER-ACNC: NEGATIVE — SIGNIFICANT CHANGE UP
CMV DNA CSF QL NAA+PROBE: 1300 IU/ML — HIGH
CMV DNA SPEC NAA+PROBE-LOG#: 3.11 LOG10IU/ML — HIGH
CULTURE RESULTS: SIGNIFICANT CHANGE UP
CULTURE RESULTS: SIGNIFICANT CHANGE UP
E CHAFFEENSIS IGG TITR SER IF: SIGNIFICANT CHANGE UP TITER
IL2 SERPL-MCNC: 2240.2 PG/ML — HIGH (ref 175.3–858.2)
P-ANCA SER-ACNC: NEGATIVE — SIGNIFICANT CHANGE UP
SPECIMEN SOURCE: SIGNIFICANT CHANGE UP
SPECIMEN SOURCE: SIGNIFICANT CHANGE UP
TM INTERPRETATION: SIGNIFICANT CHANGE UP

## 2023-07-12 PROCEDURE — 99239 HOSP IP/OBS DSCHRG MGMT >30: CPT

## 2023-07-12 PROCEDURE — 99232 SBSQ HOSP IP/OBS MODERATE 35: CPT

## 2023-07-12 RX ADMIN — Medication 400 MILLIGRAM(S): at 09:23

## 2023-07-12 RX ADMIN — Medication 100 MILLIGRAM(S): at 09:23

## 2023-07-12 RX ADMIN — FAMOTIDINE 20 MILLIGRAM(S): 10 INJECTION INTRAVENOUS at 09:23

## 2023-07-12 NOTE — DISCHARGE NOTE PROVIDER - NSDCCPCAREPLAN_GEN_ALL_CORE_FT
PRINCIPAL DISCHARGE DIAGNOSIS  Diagnosis: CMV infection  Assessment and Plan of Treatment: You were noted to have CMV infection. Your the symptoms you were experiencing were due to this viral infection. Please continue to take Ibuprofen and Tyelnol and hydrate yourself. Please follow up with your PCP, Rheumatologist and Heme/Onc soon after discharge. Infection control was discussed with you.      SECONDARY DISCHARGE DIAGNOSES  Diagnosis: Splenomegaly  Assessment and Plan of Treatment: Please avoid any contact sports  or heavy lifting for a few weeks. The rare complication of splenic rupture that can occur after trauma but can also happen spontaneously. Symptoms of rupture include sudden, sharp pain in the abdomen.  Please follow up with your PCP to seen when you can be cleared for routine work and contact sports     PRINCIPAL DISCHARGE DIAGNOSIS  Diagnosis: CMV infection  Assessment and Plan of Treatment: You were noted to have CMV infection. Your the symptoms you were experiencing were due to this viral infection. Please continue to take Ibuprofen and Tyelnol and hydrate yourself. Please follow up with your PCP, Rheumatologist and Heme/Onc soon after discharge. Infection control was discussed with you.      SECONDARY DISCHARGE DIAGNOSES  Diagnosis: Splenomegaly  Assessment and Plan of Treatment: Please avoid any contact sports  or heavy lifting for a few weeks. The rare complication of splenic rupture that can occur after trauma but can also happen spontaneously. Symptoms of rupture include sudden, sharp pain in the abdomen.  Please follow up with your PCP to seen when you can be cleared for routine work and contact sports    Diagnosis: Transaminitis  Assessment and Plan of Treatment: Please follow up with your PCP within 1 week to have your bloodwork repeated

## 2023-07-12 NOTE — DISCHARGE NOTE PROVIDER - PROVIDER TOKENS
PROVIDER:[TOKEN:[[id number]],FOLLOWUP:[1 month],ESTABLISHEDPATIENT:[T]],PROVIDER:[TOKEN:[[id number]],FOLLOWUP:[1 month],ESTABLISHEDPATIENT:[T]]

## 2023-07-12 NOTE — PROGRESS NOTE ADULT - SUBJECTIVE AND OBJECTIVE BOX
FELIBERTO CABRERA  [id number]    INTERVAL HPI/OVERNIGHT EVENTS:    MEDICATIONS  (STANDING):  doxycycline monohydrate Capsule 100 milliGRAM(s) Oral every 12 hours  famotidine    Tablet 20 milliGRAM(s) Oral daily    MEDICATIONS  (PRN):  aluminum hydroxide/magnesium hydroxide/simethicone Suspension 30 milliLiter(s) Oral every 4 hours PRN Dyspepsia  ibuprofen  Tablet. 400 milliGRAM(s) Oral every 8 hours PRN Temp greater or equal to 38C (100.4F), Mild Pain (1 - 3)  melatonin 3 milliGRAM(s) Oral at bedtime PRN Insomnia  ondansetron Injectable 4 milliGRAM(s) IV Push every 8 hours PRN Nausea and/or Vomiting      Allergies    No Known Allergies    Intolerances        Review of Systems:   General: No fevers/chills, no fatigue  HEENT: No blurry vision, dysphagia, or odynophagia  CVS: No CP/palpitations  Resp: No SOB/wheezing  GI: No N/V/C/D/abdominal pain  MSK:   Skin: No new rashes  Neuro: No headaches      Vital Signs Last 24 Hrs  T(C): 37.4 (12 Jul 2023 08:23), Max: 38.1 (11 Jul 2023 20:41)  T(F): 99.3 (12 Jul 2023 08:23), Max: 100.6 (11 Jul 2023 20:41)  HR: 72 (12 Jul 2023 08:23) (67 - 84)  BP: 107/61 (12 Jul 2023 08:23) (97/61 - 111/71)  BP(mean): --  RR: 18 (12 Jul 2023 08:23) (18 - 18)  SpO2: 98% (12 Jul 2023 08:23) (98% - 100%)    Parameters below as of 12 Jul 2023 08:23  Patient On (Oxygen Delivery Method): room air        Physical Exam:  General: NAD  HEENT: EOMI, MMM  Cardio: +S1/S2, RRR  Resp: CTA b/l  GI: +BS, soft, NT/ND  MSK:  Neuro: AAOx3  Psych: wnl    LABS:                  RADIOLOGY & ADDITIONAL TESTS:   FELIBERTO DEBORAH  [id number]    INTERVAL HPI/OVERNIGHT EVENTS: Patient overall slightly better, still with nightly fevers but lesser degree, 100.6F yesterday evening; no other symptoms reported, no joint pain/swelling, rashes, ulcerations. Tested positive for CMV IgM and +CMV PCR.     MEDICATIONS  (STANDING):  doxycycline monohydrate Capsule 100 milliGRAM(s) Oral every 12 hours  famotidine    Tablet 20 milliGRAM(s) Oral daily    MEDICATIONS  (PRN):  aluminum hydroxide/magnesium hydroxide/simethicone Suspension 30 milliLiter(s) Oral every 4 hours PRN Dyspepsia  ibuprofen  Tablet. 400 milliGRAM(s) Oral every 8 hours PRN Temp greater or equal to 38C (100.4F), Mild Pain (1 - 3)  melatonin 3 milliGRAM(s) Oral at bedtime PRN Insomnia  ondansetron Injectable 4 milliGRAM(s) IV Push every 8 hours PRN Nausea and/or Vomiting      Allergies    No Known Allergies    Intolerances            Vital Signs Last 24 Hrs  T(C): 37.4 (12 Jul 2023 08:23), Max: 38.1 (11 Jul 2023 20:41)  T(F): 99.3 (12 Jul 2023 08:23), Max: 100.6 (11 Jul 2023 20:41)  HR: 72 (12 Jul 2023 08:23) (67 - 84)  BP: 107/61 (12 Jul 2023 08:23) (97/61 - 111/71)  BP(mean): --  RR: 18 (12 Jul 2023 08:23) (18 - 18)  SpO2: 98% (12 Jul 2023 08:23) (98% - 100%)    Parameters below as of 12 Jul 2023 08:23  Patient On (Oxygen Delivery Method): room air        Physical Exam:  General: NAD  HEENT: EOMI, MMM  Cardio: +S1/S2, RRR  Resp: CTA b/l  GI: +BS, soft, NT/ND  MSK: No joint tenderness or synovitis   Neuro: AAOx3  Psych: wnl    LABS:                  RADIOLOGY & ADDITIONAL TESTS:

## 2023-07-12 NOTE — DISCHARGE NOTE PROVIDER - CARE PROVIDER_API CALL
Inge Anderson  Rheumatology  [address] [place] [address]  Phone: [phone]  Fax: [phone]  Established Patient  Follow Up Time: 1 month    Lamin Granados  Medical Oncology  [address] [facility] [place] [address]  Phone: [phone]  Fax: [phone]  Established Patient  Follow Up Time: 1 month

## 2023-07-12 NOTE — DISCHARGE NOTE PROVIDER - HOSPITAL COURSE
28 year old male no known PMHx presents to the [facility] for further evaluation and management of a 2 week history of intractable fevers (TMax 102.8'F) despite taking Acetaminophen at home. The patient was reportedly evaluated last week in the [facility]. Workup including blood work were reportedly negative. The patient was then evaluated by his PCP on Wednesday where the patient had intractable vomiting. The patient was referred to [facility] at that time where the patient reportedly had a negative infectious workup there as well. A CT scan revealed splenomegaly. The patient additionally c/o a 2 week history of headaches. He denies any associated chest pain, palpitations, shortness of breath, abdominal pain, nausea, or recent sick contacts. Vital signs in triage => /76, HR 83/min, RR 18/min, TMax 100.9'F, SpO2 99% on room air. Labs => lymph% 51, AST/ALT 78/253. CXR => No radiographic evidence of active chest disease. In the ED the patient was given Acetaminophen 650mg PO x 1, and NS x 1L.    #Fever of Unknown Origin, +splenomegaly  -likely secondary to due to CMV: +transaminitis, splenomegaly, +smear RV: atypical lymphocytes seen no obvious red cell inclusions, heterophile: neg   -Blood C+S: neg  -fungal C+S: neg  -Urinalysis neg  -Urine C+S, neg  -Lyme Vise IgG/IgM AB Reflex/babesia:  neg  -hepatitis panel: negative   -HIV: negative   -ESR/CRP/MARISOL/RF/SM/SS-A/B: negative   -f/u Ehrlichia,  Anaplasma PCR awaited  -serum protein electrophoresis negative   -IV hydration   -hold Acetaminophen for now   -cont. 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 The patient was reportedly evaluated last week in the [facility]. Workup including blood work were reportedly negative. The patient was then evaluated by his PCP on Wednesday where the patient had intractable vomiting. The patient was referred to [facility] at that time where the patient reportedly had a negative infectious workup there as well. A CT scan revealed splenomegaly. The patient additionally c/o a 2 week history of headaches. He denies any associated chest pain, palpitations, shortness of breath, abdominal pain, nausea, or recent sick contacts. Vital signs in triage => /76, HR 83/min, RR 18/min, TMax 100.9'F, SpO2 99% on room air. Labs => lymph% 51, AST/ALT 78/253. CXR => No radiographic evidence of active chest disease. In the ED the patient was given Acetaminophen 650mg PO x 1, and NS x 1L.    #Fever of Unknown Origin, +splenomegaly  -likely secondary to due to CMV: +transaminitis, splenomegaly, +smear RV: atypical lymphocytes seen no obvious red cell inclusions, heterophile: neg   -Blood C+S: neg  -fungal C+S: neg  -Urinalysis neg  -Urine C+S, neg  -Lyme Vise IgG/IgM AB Reflex/babesia:  neg  -hepatitis panel: negative   -HIV: negative   -ESR/CRP/MARISOL/RF/SM/SS-A/B: negative   -f/u Ehrlichia,  Anaplasma PCR awaited  -serum protein electrophoresis negative   -IV hydration   -hold Acetaminophen for now   -cont. 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

## 2023-07-12 NOTE — PROGRESS NOTE ADULT - SUBJECTIVE AND OBJECTIVE BOX
Patient is a 28y old  Male who presents with a chief complaint of Intractable Fevers (08 Jul 2023 14:52)      HPI:  28 year old male no known PMHx presents to the [facility] for further evaluation and management of a 2 week history of intractable fevers (TMax 102.8'F) despite taking Acetaminophen at home. The patient was reportedly evaluated last week in the [facility]. Workup including blood work were reportedly negative. The patient was then evaluated by his PCP on Wednesday where the patient had intractable vomiting. The patient was referred to [facility] at that time where the patient reportedly had a negative infectious workup there as well. A CT scan revealed splenomegaly. The patient additionally c/o a 2 week history of headaches. He denies any associated chest pain, palpitations, shortness of breath, abdominal pain, nausea, or recent sick contacts. Vital signs in triage => /76, HR 83/min, RR 18/min, TMax 100.9'F, SpO2 99% on room air. Labs => lymph% 51, AST/ALT 78/253. CXR => No radiographic evidence of active chest disease. In the ED the patient was given Acetaminophen 650mg PO x 1, and NS x 1L.   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 called    PAST MEDICAL & SURGICAL HISTORY:  No pertinent past medical history      No significant past surgical history      MEDICATIONS  (STANDING):  doxycycline monohydrate Capsule 100 milliGRAM(s) Oral every 12 hours  famotidine    Tablet 20 milliGRAM(s) Oral daily      MEDICATIONS  (PRN):  acetaminophen     Tablet .. 650 milliGRAM(s) Oral every 6 hours PRN Temp greater or equal to 38C (100.4F), Mild Pain (1 - 3)  aluminum hydroxide/magnesium hydroxide/simethicone Suspension 30 milliLiter(s) Oral every 4 hours PRN Dyspepsia  melatonin 3 milliGRAM(s) Oral at bedtime PRN Insomnia  ondansetron Injectable 4 milliGRAM(s) IV Push every 8 hours PRN Nausea and/or Vomiting      FAMILY HISTORY:  No pertinent family history in first degree relatives        SOCIAL HISTORY:  ***    REVIEW OF SYSTEM:  Pertinent items are noted in HPI.  Constitutional negative for chills, fevers, sweats and weight loss  throat, and face:  negative for epistaxis, nasal congestion, sore throat and   tinnitus  Respiratory: negative for cough, dyspnea on exertion, pleuritic chest pain  and wheezing  Cardiovascular:  negative for chest pain, dyspnea and palpitations  Gastrointestinal: negative for abdominal pain, diarrhea, nausea and vomiting  Genitourinary: negative for dysuria, frequency and urinary incontinence  Skin:  negative for redness, rash, pruritus, swelling, dryness and   fissures  Hematologic/lymphatic: negative for bleeding and easy bruising  Musculoskeletal: negative for arthralgias, back pain and muscle weakness  Neurological: negative for dizziness, headaches, seizures and tremors  Behavioral/Psych:  negative for mood change, depression, suicidal attempts    Allergic/Immunologic: negative for anaphylaxis, angioedema and urticaria    Vital Signs Last 24 Hrs  T(C): 36.8 (12 Jul 2023 04:32), Max: 38.1 (11 Jul 2023 20:41)  T(F): 98.2 (12 Jul 2023 04:32), Max: 100.6 (11 Jul 2023 20:41)  HR: 67 (12 Jul 2023 04:32) (67 - 84)  BP: 97/61 (12 Jul 2023 04:32) (97/61 - 111/75)  BP(mean): --  RR: 18 (12 Jul 2023 04:32) (18 - 18)  SpO2: 100% (12 Jul 2023 04:32) (98% - 100%)    Parameters below as of 12 Jul 2023 04:32  Patient On (Oxygen Delivery Method): room air              PHYSICAL EXAM  General Appearance: cooperative, no acute distress, febrile episodes noted  HEENT: PERRL, conjunctiva clear, EOM's intact, non injected pharynx, no exudate, TM   normal  Neck: Supple, , no adenopathy, thyroid: not enlarged, no carotid bruit or JVD  Back: Symmetric, no  tenderness,no soft tissue tenderness  Lungs: Clear to auscultation bilateral,no adventitious breath sounds, normal   expiratory phase  Heart: Regular rate and rhythm, S1, S2 normal, no murmur, rub or gallop  Abdomen: Soft, non-tender, bowel sounds active , no hepatosplenomegaly  Extremities: no cyanosis or edema, no joint swelling  Skin: Skin color, texture normal, no rashes   Neurologic: Alert and oriented X3 , cranial nerves intact, sensory and motor normal,    ECG:    LABS:                          12.2   8.75  )-----------( 200      ( 09 Jul 2023 08:38 )             35.9     07-09    139  |  109<H>  |  9   ----------------------------<  99  4.2   |  26  |  0.89    Ca    8.3<L>      09 Jul 2023 07:43    TPro  5.9<L>  /  Alb  2.8<L>  /  TBili  0.6  /  DBili  x   /  AST  91<H>  /  ALT  240<H>  /  AlkPhos  97  07-09            PT/INR - ( 07 Jul 2023 16:19 )   PT: 13.7 sec;   INR: 1.18 ratio         PTT - ( 07 Jul 2023 16:19 )  PTT:34.0 sec  Urinalysis Basic - ( 09 Jul 2023 07:43 )    Color: x / Appearance: x / SG: x / pH: x  Gluc: 99 mg/dL / Ketone: x  / Bili: x / Urobili: x   Blood: x / Protein: x / Nitrite: x   Leuk Esterase: x / RBC: x / WBC x   Sq Epi: x / Non Sq Epi: x / Bacteria: x            RADIOLOGY & ADDITIONAL STUDIES:    < from: TTE Echo Complete w/o Contrast w/ Doppler (07.08.23 @ 08:17) >     Summary     Normal aortic valve structure and function.   The aortic root is mildly dilated .   The left atrium appears minimally dilated.   The left ventricle is normal in size, wall thickness, wall motion and   contractility.   Estimated left ventricular ejectionfraction is 55-60 %.   All aortic valve leaflets were not well visualized cannot exclude a   bicuspid AV    < end of copied text >  < from: TTE Echo Complete w/o Contrast w/ Doppler (07.08.23 @ 08:17) >   Right Atrium                   RA Area: 19.4 cm^2     Right Ventricle              RV Systolic Pressure: 25.28 mmHg    < end of copied text >

## 2023-07-12 NOTE — PROGRESS NOTE ADULT - ASSESSMENT
1. fever, splenomegaly abnormal LFTs  and lymphocytosis  Acute CMV infection as evident by + IGM and CMV PCR  management as per ID  and medicine  Immunocompetent host , so unlikely to need treatment  shall sign off

## 2023-07-12 NOTE — PROGRESS NOTE ADULT - ASSESSMENT
28 year old male no known PMHx presents to the [facility] for further evaluation and management of a 2 week history of intractable fevers (TMax 102.8'F) despite taking Acetaminophen at home. The patient was reportedly evaluated last week in the [facility]. Workup including blood work were reportedly negative. The patient was then evaluated by his PCP on Wednesday where the patient had intractable vomiting. The patient was referred to [facility] at that time where the patient reportedly had a negative infectious workup there as well. A CT scan revealed splenomegaly. The patient additionally c/o a 2 week history of headaches. He denies any associated chest pain, palpitations, shortness of breath, abdominal pain, nausea, or recent sick contacts. Vital signs in triage => /76, HR 83/min, RR 18/min, TMax 100.9'F, SpO2 99% on room air. Labs => lymph% 51, AST/ALT 78/253. CXR => No radiographic evidence of active chest disease. In the ED the patient was given Acetaminophen 650mg PO x 1, and NS x 1L.   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 called    Assessment / plan:  Fever 101-102 x 2-3 weeks  etiology remains obscured  r/o tick born illness  no evidence of sinusitis or pneumonia  mild cough  Tracheal lesion seen on ct chest  r/o pulm HTN based on PA possible enlargement    ECHO suggested and data noted  thoracic surgery eval needed and awaiting downloaded films to see if bronch needed  films are done at [facility] and can be downloaded for review  fu cultures and ID recommendations  IVF hydration for ongoing fever    will have outpt ct scan downloaded  suspected viral illness with fever and splenomegaly and lymphocytosis  thoracic surgery to decide if bronch warranted vs fu ct scan imaging for re eval

## 2023-07-12 NOTE — DISCHARGE NOTE NURSING/CASE MANAGEMENT/SOCIAL WORK - PATIENT PORTAL LINK FT
You can access the FollowMyHealth Patient Portal offered by [facility] by registering at the following website: [email or web address]. By joining [facility]’s FollowMyHealth portal, you will also be able to view your health information using other applications (apps) compatible with our system.

## 2023-07-12 NOTE — PROGRESS NOTE ADULT - ASSESSMENT
28F previously healthy male here for evaluation of fever of unknown origin for past 2 weeks which is ongoing. Thus far, infectious workup including blood and urine cultures, and testing for lyme, HIV have been negative.  Rheumatology consulted for further evaluation. Notably CMV testing has been positive this admission.     Ddx of FUO includes infectious vs. autoimmune vs. malignant. Pt with no family hx of autoimmune disease, but has grandparents on both sides with history of cancer.   Infectious workup notable for +CMV IgM and +CMV PCR, making CMV the likely etiology of patient's fevers, splenomegaly and transaminitis.   CT chest done this admission showed no lymphadenopathy making other causes such as Stills, sarcoidosis, or malignancy less likely (ESR is also normal);     Stills disease now less likely as any infection is an exclusion criteria; patient does meet 4 of 5 HLH/macrophage activation syndrome criteria (high ferritin, high triglycerides, fevers and splenomegaly)- however MAS is usually secondary to another process, in this case, likely CMV infection. The inflammatory markers will likely resolve once the CMV has been cleared. No indication for steroid treatment at this time. Other autoimmune serologies have thus far resulted negative.     Upon discharge patient should follow up at [address] [place] [address] with our rheumatology office (number is [phone]- please include at time of discharge). He may follow up within 1 month after discharge. Serum ACE levels elevated and may be repeated as an outpatient- during this admission the elevation may be related to patient's transaminitis- due to lack of lymphadenopathy sarcoidosis is not suspected at this time.     Inge Anderson MD  [facility]- Rheumatology at [place]  [phone]

## 2023-07-12 NOTE — PROGRESS NOTE ADULT - SUBJECTIVE AND OBJECTIVE BOX
Patient is a 28y old  Male who presents with a chief complaint of Intractable Fevers (10 Jul 2023 16:40)      HPI:  28 year old male no known PMHx presents to the [facility] for further evaluation and management of a 2 week history of intractable fevers (TMax 102.8'F) despite taking Acetaminophen at home. The patient was reportedly evaluated last week in the [facility]. Workup including blood work were reportedly negative. The patient was then evaluated by his PCP on Wednesday where the patient had intractable vomiting. The patient was referred to [facility] at that time where the patient reportedly had a negative infectious workup there as well. A CT scan revealed splenomegaly 17.7 cm      The patient additionally c/o a 2 week history of headaches. He denies any associated chest pain, palpitations, shortness of breath, abdominal pain, nausea, or recent sick contacts. Vital signs in triage => /76, HR 83/min, RR 18/min, TMax 100.9'F, SpO2 99% on room air. Labs => lymph% 51, AST/ALT 78/253. CXR => No radiographic evidence of active chest disease. In the ED the patient was given Acetaminophen 650mg PO x 1, and NS x 1L.   (07 Jul 2023 22:07)    has sweats as well    CMV IgM+  CMV PCR 1300+    less febrile  having some headaches        PAST MEDICAL & SURGICAL HISTORY:  No pertinent past medical history      No significant past surgical history          REVIEW OF SYSTEMS    General:	  fever  chills   sweats  left upper quadrant discomfort  Headaches      Allergies    No Known Allergies    Intolerances        Occupation:           FAMILY HISTORY:  No pertinent family history in first degree relatives        Home Medications:      MEDICATIONS  (STANDING):  doxycycline monohydrate Capsule 100 milliGRAM(s) Oral every 12 hours  famotidine    Tablet 20 milliGRAM(s) Oral daily    MEDICATIONS  (PRN):  aluminum hydroxide/magnesium hydroxide/simethicone Suspension 30 milliLiter(s) Oral every 4 hours PRN Dyspepsia  ibuprofen  Tablet. 400 milliGRAM(s) Oral every 8 hours PRN Temp greater or equal to 38C (100.4F), Mild Pain (1 - 3)  melatonin 3 milliGRAM(s) Oral at bedtime PRN Insomnia  ondansetron Injectable 4 milliGRAM(s) IV Push every 8 hours PRN Nausea and/or Vomiting    Vital Signs Last 24 Hrs  T(C): 37.4 (12 Jul 2023 08:23), Max: 38.1 (11 Jul 2023 20:41)  T(F): 99.3 (12 Jul 2023 08:23), Max: 100.6 (11 Jul 2023 20:41)  HR: 72 (12 Jul 2023 08:23) (67 - 84)  BP: 107/61 (12 Jul 2023 08:23) (97/61 - 111/71)  BP(mean): --  RR: 18 (12 Jul 2023 08:23) (18 - 18)  SpO2: 98% (12 Jul 2023 08:23) (98% - 100%)    Parameters below as of 12 Jul 2023 08:23  Patient On (Oxygen Delivery Method): room air            Gen:  well  febrile   spleen palpable, tender      LABS:                        12.6   8.61  )-----------( 198      ( 10 Jul 2023 08:57 )             36.3     10 Jul 2023 08:57    smear RV;  atypical lymphocytes seen  no obvious red cell inclusions      CMV IgM + >240  CMV PCR + 1300    142    |  110    |  8      ----------------------------<  91     3.8     |  27     |  0.87     Ca    8.3        10 Jul 2023 08:57    TPro  6.1    /  Alb  2.8    /  TBili  0.6    /  DBili  x      /  AST  109    /  ALT  298    /  AlkPhos  105    10 Jul 2023 08:57    LIVER FUNCTIONS - ( 10 Jul 2023 08:57 )  Alb: 2.8 g/dL / Pro: 6.1 gm/dL / ALK PHOS: 105 U/L / ALT: 298 U/L / AST: 109 U/L / GGT: x               Culture - Blood (collected 07-07-23 @ 16:32)  Source: .Blood None  Preliminary Report (07-09-23 @ 19:01):    No growth at 48 Hours    Culture - Blood (collected 07-07-23 @ 16:19)  Source: .Blood None  Preliminary Report (07-09-23 @ 19:01):    No growth at 48 Hours        RADIOLOGY & ADDITIONAL STUDIES:    ACC: [id number] EXAM:  CT CHEST   ORDERED BY: TERESO JONES     PROCEDURE DATE:  07/10/2023          INTERPRETATION:  HISTORY: Shortness of breath. Rule out pneumonia.    EXAMINATION: CT CHEST was performed without IV contrast.    COMPARISON: None available.    FINDINGS:    AIRWAYS, LUNGS, PLEURA: Clear central airways. No consolidation. Trace   left pleural effusion.    MEDIASTINUM: Normal heart size. No pericardial effusion. Thoracic aorta   normal caliber.  No large mediastinal lymph nodes.    IMAGED ABDOMEN: Partially imaged spleen measures 15.6 cm.    SOFT TISSUES: Bilateral gynecomastia.    BONES: Unremarkable.      IMPRESSION:.    No pneumonia.    Trace left pleural effusion.    Splenomegaly.    Bilateral gynecomastia.    --- End of Report ---

## 2023-07-12 NOTE — PROGRESS NOTE ADULT - PROVIDER SPECIALTY LIST ADULT
Pulmonology
Hospitalist
Hospitalist
Infectious Disease
Pulmonology
Thoracic Surgery
Heme/Onc
Hospitalist
Pulmonology
Rheumatology
Heme/Onc
Hospitalist

## 2023-07-12 NOTE — PROGRESS NOTE ADULT - REASON FOR ADMISSION
Intractable Fevers
Intractable Fevers  abdominal pain
Intractable Fevers

## 2023-07-12 NOTE — DISCHARGE NOTE NURSING/CASE MANAGEMENT/SOCIAL WORK - NSDCPEFALRISK_GEN_ALL_CORE
For information on Fall & Injury Prevention, visit: [email or web address] OR  [email or web address] OR  https://www.cdc.gov/steadi/patient.html

## 2023-07-13 LAB
CD3-/CD16+CD56+(%): 19 % — SIGNIFICANT CHANGE UP (ref 4–25)
CD3-CD16+CD56+(ABS): 1068 CELLS/UL — HIGH (ref 70–760)
LYMPHOCYTES, ABSOLUTE: 5530 CELLS/UL — HIGH (ref 850–3900)

## 2023-07-18 DIAGNOSIS — D72.820 LYMPHOCYTOSIS (SYMPTOMATIC): ICD-10-CM

## 2023-07-18 DIAGNOSIS — J39.8 OTHER SPECIFIED DISEASES OF UPPER RESPIRATORY TRACT: ICD-10-CM

## 2023-07-18 DIAGNOSIS — N62 HYPERTROPHY OF BREAST: ICD-10-CM

## 2023-07-18 DIAGNOSIS — R16.1 SPLENOMEGALY, NOT ELSEWHERE CLASSIFIED: ICD-10-CM

## 2023-07-18 DIAGNOSIS — B25.9 CYTOMEGALOVIRAL DISEASE, UNSPECIFIED: ICD-10-CM

## 2023-07-18 DIAGNOSIS — R74.01 ELEVATION OF LEVELS OF LIVER TRANSAMINASE LEVELS: ICD-10-CM

## 2023-07-24 LAB — CHROM ANALY OVERALL INTERP SPEC-IMP: SIGNIFICANT CHANGE UP

## 2023-08-05 LAB
CULTURE RESULTS: SIGNIFICANT CHANGE UP
SPECIMEN SOURCE: SIGNIFICANT CHANGE UP

## 2023-10-19 ENCOUNTER — NON-APPOINTMENT (OUTPATIENT)
Age: 28
End: 2023-10-19

## 2025-04-17 NOTE — ED ADULT TRIAGE NOTE - STATUS:
4 clinical seizures one day 3 stereotyped and 1 with a slight different onset  s/p Briviact 100 and will continue monitoring  plan for mapping Monday followed by OR for explant
Applied